# Patient Record
Sex: FEMALE | Employment: UNEMPLOYED | URBAN - METROPOLITAN AREA
[De-identification: names, ages, dates, MRNs, and addresses within clinical notes are randomized per-mention and may not be internally consistent; named-entity substitution may affect disease eponyms.]

---

## 2020-07-31 ENCOUNTER — TELEPHONE (OUTPATIENT)
Dept: INTERNAL MEDICINE CLINIC | Age: 50
End: 2020-07-31

## 2020-07-31 RX ORDER — LISINOPRIL AND HYDROCHLOROTHIAZIDE 12.5; 2 MG/1; MG/1
1 TABLET ORAL
Qty: 30 TAB | Refills: 0 | Status: SHIPPED | OUTPATIENT
Start: 2020-07-31 | End: 2020-08-21 | Stop reason: SDUPTHER

## 2020-07-31 RX ORDER — LORATADINE 10 MG/1
10 TABLET ORAL DAILY
Qty: 30 TAB | Refills: 0 | Status: SHIPPED | OUTPATIENT
Start: 2020-07-31 | End: 2020-08-04

## 2020-07-31 NOTE — TELEPHONE ENCOUNTER
I sent rx,but she has not been coming follow-up even though she is scheduled, I will give her only for 30 days and I already gave except lorazepam.,

## 2020-07-31 NOTE — TELEPHONE ENCOUNTER
Patient called requesting refills on Lorazepam 0.25 mg bid, Lisinopril-HCTZ 20-12.5 mg daily, Loratadine 10 mg daily.  30 day supply

## 2020-08-03 ENCOUNTER — TELEPHONE (OUTPATIENT)
Dept: INTERNAL MEDICINE CLINIC | Age: 50
End: 2020-08-03

## 2020-08-03 RX ORDER — LORATADINE 10 MG/1
10 TABLET ORAL DAILY
Qty: 30 TAB | Refills: 0 | Status: SHIPPED | OUTPATIENT
Start: 2020-08-03 | End: 2020-08-04 | Stop reason: SDUPTHER

## 2020-08-04 RX ORDER — LORATADINE 10 MG/1
TABLET ORAL
Qty: 30 TAB | Refills: 0 | Status: SHIPPED | OUTPATIENT
Start: 2020-08-04 | End: 2020-08-21 | Stop reason: SDUPTHER

## 2020-08-08 RX ORDER — CYCLOBENZAPRINE HCL 10 MG
TABLET ORAL
Qty: 15 TAB | Refills: 0 | OUTPATIENT
Start: 2020-08-08

## 2020-08-20 PROBLEM — I50.9 CHF (CONGESTIVE HEART FAILURE) (HCC): Status: ACTIVE | Noted: 2020-08-20

## 2020-08-20 PROBLEM — M72.2 PLANTAR FASCIITIS, BILATERAL: Status: ACTIVE | Noted: 2020-08-20

## 2020-08-20 PROBLEM — S76.319A STRAIN OF HAMSTRING: Status: ACTIVE | Noted: 2020-08-20

## 2020-08-20 PROBLEM — I10 ESSENTIAL HYPERTENSION: Status: ACTIVE | Noted: 2020-08-20

## 2020-08-20 PROBLEM — J30.2 SEASONAL RHINITIS: Status: ACTIVE | Noted: 2020-08-20

## 2020-08-20 PROBLEM — E55.9 VITAMIN D DEFICIENCY: Status: ACTIVE | Noted: 2020-08-20

## 2020-08-20 PROBLEM — Z82.49 FAMILY HISTORY OF CARDIOVASCULAR DISEASE: Status: ACTIVE | Noted: 2020-08-20

## 2020-08-20 PROBLEM — Z83.3 FAMILY HISTORY OF DIABETES MELLITUS (DM): Status: ACTIVE | Noted: 2020-08-20

## 2020-08-20 PROBLEM — E66.01 MORBID OBESITY (HCC): Status: ACTIVE | Noted: 2020-08-20

## 2020-08-20 RX ORDER — LANOLIN ALCOHOL/MO/W.PET/CERES
CREAM (GRAM) TOPICAL
COMMUNITY
End: 2020-08-21 | Stop reason: SDUPTHER

## 2020-08-20 RX ORDER — ERGOCALCIFEROL 1.25 MG/1
50000 CAPSULE ORAL
COMMUNITY
End: 2020-08-21 | Stop reason: SDUPTHER

## 2020-08-21 ENCOUNTER — OFFICE VISIT (OUTPATIENT)
Dept: INTERNAL MEDICINE CLINIC | Age: 50
End: 2020-08-21
Payer: COMMERCIAL

## 2020-08-21 VITALS
HEIGHT: 62 IN | DIASTOLIC BLOOD PRESSURE: 82 MMHG | RESPIRATION RATE: 18 BRPM | OXYGEN SATURATION: 98 % | HEART RATE: 72 BPM | BODY MASS INDEX: 40.34 KG/M2 | TEMPERATURE: 98.2 F | SYSTOLIC BLOOD PRESSURE: 130 MMHG | WEIGHT: 219.2 LBS

## 2020-08-21 DIAGNOSIS — J30.2 SEASONAL ALLERGIC RHINITIS, UNSPECIFIED TRIGGER: ICD-10-CM

## 2020-08-21 DIAGNOSIS — Z13.220 SCREENING FOR CHOLESTEROL LEVEL: ICD-10-CM

## 2020-08-21 DIAGNOSIS — D50.0 IRON DEFICIENCY ANEMIA DUE TO CHRONIC BLOOD LOSS: ICD-10-CM

## 2020-08-21 DIAGNOSIS — E55.9 VITAMIN D DEFICIENCY: ICD-10-CM

## 2020-08-21 DIAGNOSIS — I10 ESSENTIAL HYPERTENSION: Primary | ICD-10-CM

## 2020-08-21 DIAGNOSIS — F32.9 REACTIVE DEPRESSION (SITUATIONAL): ICD-10-CM

## 2020-08-21 DIAGNOSIS — F41.9 ANXIETY: ICD-10-CM

## 2020-08-21 DIAGNOSIS — E66.01 MORBID OBESITY (HCC): ICD-10-CM

## 2020-08-21 DIAGNOSIS — R73.03 PREDIABETES: ICD-10-CM

## 2020-08-21 DIAGNOSIS — M72.2 PLANTAR FASCIITIS, BILATERAL: ICD-10-CM

## 2020-08-21 DIAGNOSIS — N92.0 MENORRHAGIA WITH REGULAR CYCLE: ICD-10-CM

## 2020-08-21 PROCEDURE — 99214 OFFICE O/P EST MOD 30 MIN: CPT | Performed by: INTERNAL MEDICINE

## 2020-08-21 RX ORDER — LORATADINE 10 MG/1
10 TABLET ORAL DAILY
Qty: 90 TAB | Refills: 1 | Status: SHIPPED | OUTPATIENT
Start: 2020-08-21 | End: 2020-12-22 | Stop reason: SDUPTHER

## 2020-08-21 RX ORDER — LANOLIN ALCOHOL/MO/W.PET/CERES
325 CREAM (GRAM) TOPICAL 2 TIMES DAILY
Qty: 180 TAB | Refills: 1 | Status: SHIPPED | OUTPATIENT
Start: 2020-08-21 | End: 2022-05-19 | Stop reason: ALTCHOICE

## 2020-08-21 RX ORDER — HYDROXYZINE 25 MG/1
25 TABLET, FILM COATED ORAL
Qty: 90 TAB | Refills: 1 | Status: SHIPPED | OUTPATIENT
Start: 2020-08-21 | End: 2020-08-31

## 2020-08-21 RX ORDER — ERGOCALCIFEROL 1.25 MG/1
50000 CAPSULE ORAL
Qty: 4 CAP | Refills: 0 | Status: SHIPPED | OUTPATIENT
Start: 2020-08-21 | End: 2020-08-23 | Stop reason: SDUPTHER

## 2020-08-21 RX ORDER — FLUTICASONE PROPIONATE 50 MCG
2 SPRAY, SUSPENSION (ML) NASAL DAILY
Qty: 1 BOTTLE | Refills: 3 | Status: SHIPPED | OUTPATIENT
Start: 2020-08-21 | End: 2020-12-22 | Stop reason: SDUPTHER

## 2020-08-21 RX ORDER — LISINOPRIL AND HYDROCHLOROTHIAZIDE 12.5; 2 MG/1; MG/1
1 TABLET ORAL
Qty: 30 TAB | Refills: 0 | Status: SHIPPED | OUTPATIENT
Start: 2020-08-21 | End: 2020-08-21 | Stop reason: SDUPTHER

## 2020-08-21 RX ORDER — LISINOPRIL AND HYDROCHLOROTHIAZIDE 12.5; 2 MG/1; MG/1
1 TABLET ORAL
Qty: 90 TAB | Refills: 1 | Status: SHIPPED | OUTPATIENT
Start: 2020-08-21 | End: 2020-12-22 | Stop reason: SDUPTHER

## 2020-08-21 RX ORDER — OMEPRAZOLE 40 MG/1
40 CAPSULE, DELAYED RELEASE ORAL DAILY
Qty: 30 CAP | Refills: 2 | Status: SHIPPED | OUTPATIENT
Start: 2020-08-21 | End: 2020-12-22 | Stop reason: SDUPTHER

## 2020-08-21 RX ORDER — ESCITALOPRAM OXALATE 10 MG/1
10 TABLET ORAL
Qty: 90 TAB | Refills: 0 | Status: SHIPPED | OUTPATIENT
Start: 2020-08-21 | End: 2020-12-22 | Stop reason: SDUPTHER

## 2020-08-21 NOTE — PROGRESS NOTES
Tutu Barrera is a 51 y.o. female and presents with Follow-up (C/O  tingling and nubness feet bilat X several  months,Request meds for anxiety attacks ( nervous, jittery, ), )    She has established with me and came only once as a new patient. She told me that currently she is out of work and kept out of work by her orthopedic surgeon for her plantar fasciitis for 1 month. She is going to resume her job from 26th August.  She does not do any exercise but she was given steroid injection and Medrol Dosepak. She has allergies taking antihistaminic and steroid nasal spray and wants refill. 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 She has history of anemia due to menorrhagia and she had fibroid but she has not made appointment with gynecologist taking iron supplementation. She had echocardiogram of heart by cardiologist on 11th September and her EF was normal.  She has not consulted gynecologist over a year. She told me that sometimes she is anxious and jittery  And sometimes she has gas and bloating. No nausea vomiting no fever or chills no exposure to COVID-19 and no blood in stool or urine. She has some weight gain due to lack of physical activity. Her hemoglobin was her TSH was normal 10.9. And random blood sugar was 121,  . No depression.   No negative thoughts but sometimes she feels stressed out and she agreed to try low-dose of escitalopram.,    Sometimes she has tingling and numbness in both feet for several months, no neurologic weakness, she has plantar fasciitis she is not doing any stretching exercise and I gave her handouts. She has multiple vague complaints. Review of Systems    Review of Systems   Constitutional: Negative. HENT: Negative for hearing loss. Eyes: Negative for blurred vision. Respiratory: Negative for cough, shortness of breath and wheezing. Cardiovascular: Negative. Gastrointestinal: Negative for constipation, diarrhea, heartburn and nausea. sometimes  having gas and bloating. Genitourinary: Negative. Musculoskeletal: Negative for back pain and myalgias. Neurological: Positive for tingling. Negative for dizziness, focal weakness and weakness. Sometimes tingling and numbness in both feet for last several months also having plantar fasciitis in both feet. Psychiatric/Behavioral: Negative for depression and substance abuse. The patient is nervous/anxious. The patient does not have insomnia.          Past Medical History:   Diagnosis Date    Anemia     CHF (congestive heart failure) ([facility] [address] 8/20/2020    Depression     Essential hypertension 8/20/2020    Family history of cardiovascular disease 8/20/2020    Family history of diabetes mellitus (DM) 8/20/2020    GERD (gastroesophageal reflux disease)     Plantar fasciitis, bilateral 8/20/2020    Seasonal rhinitis 8/20/2020    Strain of hamstring 8/20/2020    Vitamin D deficiency 8/20/2020     Past Surgical History:   Procedure Laterality Date    ABDOMEN SURGERY PROC UNLISTED      HX GYN      HX TUBAL LIGATION  1999     Social History     Socioeconomic History    Marital status: SINGLE     Spouse name: Not on file    Number of children: Not on file    Years of education: Not on file    Highest education level: Not on file   Tobacco Use    Smoking status: Never Smoker    Smokeless tobacco: Never Used   Substance and Sexual Activity    Alcohol use: Not Currently    Drug use: Never     Family History   Problem Relation Age of Onset    Hypertension Mother     Diabetes Mother     Hypertension Father     Diabetes Father     Diabetes Sister     Hypertension Sister     Diabetes Brother     Hypertension Brother      Current Outpatient Medications   Medication Sig Dispense Refill    lisinopril-hydroCHLOROthiazide (PRINZIDE, ZESTORETIC) 20-12.5 mg per tablet Take 1 Tab by mouth every morning. 90 Tab 1    ferrous sulfate 325 mg (65 mg iron) tablet Take 1 Tab by mouth two (2) times a day. 180 Tab 1    loratadine (CLARITIN) 10 mg tablet Take 1 Tab by mouth daily. 90 Tab 1    ergocalciferol (Vitamin D2) 1,250 mcg (50,000 unit) capsule Take 1 Cap by mouth every seven (7) days for 30 days. 4 Cap 0    fluticasone propionate (FLONASE) 50 mcg/actuation nasal spray 2 Sprays by Both Nostrils route daily. 1 Bottle 3    hydrOXYzine HCL (ATARAX) 25 mg tablet Take 1 Tab by mouth three (3) times daily as needed for Anxiety for up to 10 days. 90 Tab 1    escitalopram oxalate (LEXAPRO) 10 mg tablet Take 1 Tab by mouth nightly. 90 Tab 0    omeprazole (PRILOSEC) 40 mg capsule Take 1 Cap by mouth daily. Take  1 capsule ,30 minutes before eating once a day 30 Cap 2     No Known Allergies    Objective:  Visit Vitals  /82 (BP 1 Location: Left arm, BP Patient Position: Sitting)   Pulse 72   Temp 98.2 °F (36.8 °C) (Oral)   Resp 18   Ht 5' 2\" (1.575 m)   Wt 219 lb 3.2 oz (99.4 kg)   SpO2 98%   BMI 40.09 kg/m²       Physical Exam:   Constitutional: General Appearance: morbid obese. Level of Distress: NAD. Ambulation: ambulating   Psychiatric: Mental Status: normal mood and affect and active and alert. Orientation: to time, place, and person. no agitation. ,normal eye contact. normal insight  Head: Head: normocephalic and atraumatic. Eyes: Pupils: PERRLA. Sclerae: non-icteric. Neck: Neck: supple, trachea midline, and no masses. Lymph Nodes: no cervical LAD.  Thyroid: no enlargement or nodules and non-tender. Lungs: Respiratory effort: no dyspnea. Auscultation: no wheezing, rales/crackles, or rhonchi and breath sounds normal and good air movement. Cardiovascular: Apical Impulse: not displaced. Heart Auscultation: normal S1 and S2; no murmurs, rubs, or gallops; and RRR. Neck vessels: no carotid bruits. Pulses including femoral / pedal: normal throughout. Abdomen: Bowel Sounds: normal. Inspection and Palpation: no tenderness, guarding, or masses and soft and non-distended. Liver: non-tender and no hepatomegaly. Spleen: non-tender and no splenomegaly. Musculoskeletal[de-identified] Extremities: no edema,no varicosities. No Calf tenderness. Neurologic: Gait and Station: normal gait and station. Motor Strength normal right and left. I could not appreciate any abnormal neurologic exam  Skin: Inspection and palpation: no rash, lesions, or ulcer. No results found for this or any previous visit. Assessment/Plan:    ICD-10-CM ICD-9-CM    1. Essential hypertension  I10 401.9 CBC WITH AUTOMATED DIFF      METABOLIC PANEL, BASIC   2. Iron deficiency anemia due to chronic blood loss  D50.0 280.0 CBC WITH AUTOMATED DIFF      IRON PROFILE      FERRITIN   3. Anxiety  F41.9 300.00    4. Reactive depression (situational)  F32.9 300.4    5. Prediabetes  R73.03 790.29 HEMOGLOBIN A1C WITH EAG   6. Vitamin D deficiency  E55.9 268.9 VITAMIN D, 25 HYDROXY   7. Seasonal allergic rhinitis, unspecified trigger  J30.2 477.9    8. Plantar fasciitis, bilateral  M72.2 728.71    9. Menorrhagia with regular cycle  N92.0 626.2    10. Morbid obesity ([facility] [address]  E66.01 278.01    11.  Screening for cholesterol level  Z13.220 V77.91 LIPID PANEL     Orders Placed This Encounter    CBC WITH AUTOMATED DIFF    IRON PROFILE    FERRITIN    VITAMIN D, 25 HYDROXY    HEMOGLOBIN A1C WITH EAG    METABOLIC PANEL, BASIC    LIPID PANEL    DISCONTD: lisinopril-hydroCHLOROthiazide (PRINZIDE, ZESTORETIC) 20-12.5 mg per tablet Sig: Take 1 Tab by mouth every morning. Dispense:  30 Tab     Refill:  0    lisinopril-hydroCHLOROthiazide (PRINZIDE, ZESTORETIC) 20-12.5 mg per tablet     Sig: Take 1 Tab by mouth every morning. Dispense:  90 Tab     Refill:  1    ferrous sulfate 325 mg (65 mg iron) tablet     Sig: Take 1 Tab by mouth two (2) times a day. Dispense:  180 Tab     Refill:  1    loratadine (CLARITIN) 10 mg tablet     Sig: Take 1 Tab by mouth daily. Dispense:  90 Tab     Refill:  1    ergocalciferol (Vitamin D2) 1,250 mcg (50,000 unit) capsule     Sig: Take 1 Cap by mouth every seven (7) days for 30 days. Dispense:  4 Cap     Refill:  0    fluticasone propionate (FLONASE) 50 mcg/actuation nasal spray     Si Sprays by Both Nostrils route daily. Dispense:  1 Bottle     Refill:  3    hydrOXYzine HCL (ATARAX) 25 mg tablet     Sig: Take 1 Tab by mouth three (3) times daily as needed for Anxiety for up to 10 days. Dispense:  90 Tab     Refill:  1    escitalopram oxalate (LEXAPRO) 10 mg tablet     Sig: Take 1 Tab by mouth nightly. Dispense:  90 Tab     Refill:  0    omeprazole (PRILOSEC) 40 mg capsule     Sig: Take 1 Cap by mouth daily. Take  1 capsule ,30 minutes before eating once a day     Dispense:  30 Cap     Refill:  2      hypertension controlled continue lisinopril hydrochlorothiazide 20/12.5 mg once a day. Diet low in sodium. She had undergone echocardiogram in 2019 showing almost normal ejection fraction. She has stopped seeing cardiologist.  She is not a good reliable historian and not compliant having regular follow-up with physicians. Anemia due to menorrhagia and uterine fibroid and she has not consulted gynecologist over a year and recommended to make appointment until that time continue iron supplementation 1 pill twice a day and most recent hemoglobin was 10.9 in 2020. Prediabetes with hemoglobin A1c 6.2% in February.   She never disclosed but today I reviewed her records from previous PCP and discussed with her she is not serious about her diet or weight discussed about diet less than 1600 ADA diet and exercise minimum walking minimum 150 minutes/week and hemoglobin A1c ordered. Complaining of tingling and numbness in both feet exact etiology is unclear but may be due to tarsal tunnel syndrome and she has plantar fasciitis I do not see any serious etiology in her legs she has taken steroid injection and oral steroid prescribed by orthopedic surgeon. Discussed about stretching exercise and handouts provided. Continued vitamin D once a week and vitamin D level ordered having very low vitamin D in February only 9.1 level. Feeling bloating I will start her on omeprazole and change in diet. Healthy diet with more vegetables and fruits and less fried food and fast food and less fat in the diet. 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 It seems like she has situational sadness but no negative thoughts and mainly due to the stress from the work side effects of escitalopram explained 10 mg once a day at bedtime. ,    Morbid obesity with lifestyle modification and BMI is 40 she should work on her diet and physical activity and she should be serious to cut down excessive calories in intake in the diet and balanced nutritional diet with portion control. Allergic rhinitis continued on take is on nasal spray and loratadine. I reviewed her labs done in April and I logged in Korin Vogel and discussed with her and I reviewed the consult note from cardiologist and echocardiogram from the cardiologist and previous more note from her previous PCP. Labs ordered. Refills given. Side effects discussed.   Education and counseling given.  Follow-up in 3 months. Side effects of hydroxyzine and escitalopram explained. If required I will refer for nerve conduction study. lose weight, increase physical activity, follow low fat diet, follow low salt diet, routine labs ordered, call if any problems    There are no Patient Instructions on file for this visit. Follow-up and Dispositions    · Return in about 3 months (around 11/21/2020) for htn ,prediabetes ,anemia,fasting labs now.

## 2020-08-22 LAB
25(OH)D3+25(OH)D2 SERPL-MCNC: 18.6 NG/ML (ref 30–100)
BASOPHILS # BLD AUTO: 0 X10E3/UL (ref 0–0.2)
BASOPHILS NFR BLD AUTO: 0 %
BUN SERPL-MCNC: 11 MG/DL (ref 6–24)
BUN/CREAT SERPL: 16 (ref 9–23)
CALCIUM SERPL-MCNC: 9.4 MG/DL (ref 8.7–10.2)
CHLORIDE SERPL-SCNC: 103 MMOL/L (ref 96–106)
CHOLEST SERPL-MCNC: 227 MG/DL (ref 100–199)
CO2 SERPL-SCNC: 24 MMOL/L (ref 20–29)
CREAT SERPL-MCNC: 0.7 MG/DL (ref 0.57–1)
EOSINOPHIL # BLD AUTO: 0.1 X10E3/UL (ref 0–0.4)
EOSINOPHIL NFR BLD AUTO: 1 %
ERYTHROCYTE [DISTWIDTH] IN BLOOD BY AUTOMATED COUNT: 14.4 % (ref 11.7–15.4)
EST. AVERAGE GLUCOSE BLD GHB EST-MCNC: 143 MG/DL
FERRITIN SERPL-MCNC: 13 NG/ML (ref 15–150)
GLUCOSE SERPL-MCNC: 119 MG/DL (ref 65–99)
HBA1C MFR BLD: 6.6 % (ref 4.8–5.6)
HCT VFR BLD AUTO: 33.7 % (ref 34–46.6)
HDLC SERPL-MCNC: 77 MG/DL
HGB BLD-MCNC: 10.8 G/DL (ref 11.1–15.9)
IMM GRANULOCYTES # BLD AUTO: 0 X10E3/UL (ref 0–0.1)
IMM GRANULOCYTES NFR BLD AUTO: 0 %
IRON SATN MFR SERPL: 7 % (ref 15–55)
IRON SERPL-MCNC: 29 UG/DL (ref 27–159)
LDLC SERPL CALC-MCNC: 133 MG/DL (ref 0–99)
LYMPHOCYTES # BLD AUTO: 1.9 X10E3/UL (ref 0.7–3.1)
LYMPHOCYTES NFR BLD AUTO: 32 %
MCH RBC QN AUTO: 23.9 PG (ref 26.6–33)
MCHC RBC AUTO-ENTMCNC: 32 G/DL (ref 31.5–35.7)
MCV RBC AUTO: 75 FL (ref 79–97)
MONOCYTES # BLD AUTO: 0.5 X10E3/UL (ref 0.1–0.9)
MONOCYTES NFR BLD AUTO: 9 %
NEUTROPHILS # BLD AUTO: 3.4 X10E3/UL (ref 1.4–7)
NEUTROPHILS NFR BLD AUTO: 58 %
PLATELET # BLD AUTO: 310 X10E3/UL (ref 150–450)
POTASSIUM SERPL-SCNC: 3.9 MMOL/L (ref 3.5–5.2)
RBC # BLD AUTO: 4.51 X10E6/UL (ref 3.77–5.28)
SODIUM SERPL-SCNC: 141 MMOL/L (ref 134–144)
TIBC SERPL-MCNC: 423 UG/DL (ref 250–450)
TRIGL SERPL-MCNC: 85 MG/DL (ref 0–149)
UIBC SERPL-MCNC: 394 UG/DL (ref 131–425)
VLDLC SERPL CALC-MCNC: 17 MG/DL (ref 5–40)
WBC # BLD AUTO: 5.9 X10E3/UL (ref 3.4–10.8)

## 2020-08-23 RX ORDER — ERGOCALCIFEROL 1.25 MG/1
50000 CAPSULE ORAL
Qty: 12 CAP | Refills: 0 | Status: SHIPPED | OUTPATIENT
Start: 2020-08-23 | End: 2020-11-21

## 2020-08-23 RX ORDER — ASCORBIC ACID 250 MG
250 TABLET ORAL 2 TIMES DAILY
Qty: 180 TAB | Refills: 1 | Status: SHIPPED | OUTPATIENT
Start: 2020-08-23 | End: 2021-06-11 | Stop reason: ALTCHOICE

## 2020-08-23 RX ORDER — METFORMIN HYDROCHLORIDE 500 MG/1
500 TABLET ORAL
Qty: 90 TAB | Refills: 0 | Status: SHIPPED | OUTPATIENT
Start: 2020-08-23 | End: 2020-12-22 | Stop reason: SINTOL

## 2020-08-23 RX ORDER — FERROUS SULFATE 325(65) MG
325 TABLET, DELAYED RELEASE (ENTERIC COATED) ORAL 2 TIMES DAILY WITH MEALS
Qty: 180 TAB | Refills: 1 | Status: SHIPPED | OUTPATIENT
Start: 2020-08-23 | End: 2020-12-22 | Stop reason: SDUPTHER

## 2020-08-23 NOTE — TELEPHONE ENCOUNTER
I prescribed  iron, vitamin C, metformin, vitamin D I sent all prescription to the pharmacy patient should collect patient  will be informed by my nurse for her abnormal labs and she should consult me in 3 months.,

## 2020-08-29 ENCOUNTER — TELEPHONE (OUTPATIENT)
Dept: INTERNAL MEDICINE CLINIC | Age: 50
End: 2020-08-29

## 2020-08-29 NOTE — TELEPHONE ENCOUNTER
S/W PT.   To review results of labs, per Dr. Mary Moulton are both low start OTC ferrous sulafate 325 mg per day, health diet, cut back on sugars, starches, fatty and fast foods, more leafy green veggies, start Meftormin 500 mg per  Day with large meal. VIT D  Low at 18.6 ( should at least be 30.0 ), RX at  the pharmacy 50,000 units once weekly

## 2020-12-22 ENCOUNTER — OFFICE VISIT (OUTPATIENT)
Dept: INTERNAL MEDICINE CLINIC | Age: 50
End: 2020-12-22
Payer: COMMERCIAL

## 2020-12-22 VITALS
DIASTOLIC BLOOD PRESSURE: 80 MMHG | OXYGEN SATURATION: 97 % | RESPIRATION RATE: 18 BRPM | WEIGHT: 214 LBS | HEIGHT: 62 IN | BODY MASS INDEX: 39.38 KG/M2 | SYSTOLIC BLOOD PRESSURE: 130 MMHG | HEART RATE: 72 BPM

## 2020-12-22 DIAGNOSIS — I10 ESSENTIAL HYPERTENSION: ICD-10-CM

## 2020-12-22 DIAGNOSIS — D64.9 ANEMIA, UNSPECIFIED TYPE: ICD-10-CM

## 2020-12-22 DIAGNOSIS — M54.2 NECK PAIN: ICD-10-CM

## 2020-12-22 DIAGNOSIS — E66.01 MORBID OBESITY (HCC): ICD-10-CM

## 2020-12-22 DIAGNOSIS — E11.8 CONTROLLED TYPE 2 DIABETES MELLITUS WITH COMPLICATION, WITHOUT LONG-TERM CURRENT USE OF INSULIN (HCC): ICD-10-CM

## 2020-12-22 DIAGNOSIS — K21.9 GASTROESOPHAGEAL REFLUX DISEASE WITHOUT ESOPHAGITIS: ICD-10-CM

## 2020-12-22 DIAGNOSIS — E78.00 PURE HYPERCHOLESTEROLEMIA: ICD-10-CM

## 2020-12-22 DIAGNOSIS — J30.9 ALLERGIC RHINITIS, UNSPECIFIED SEASONALITY, UNSPECIFIED TRIGGER: ICD-10-CM

## 2020-12-22 DIAGNOSIS — E55.9 VITAMIN D DEFICIENCY: ICD-10-CM

## 2020-12-22 PROCEDURE — 99214 OFFICE O/P EST MOD 30 MIN: CPT | Performed by: INTERNAL MEDICINE

## 2020-12-22 RX ORDER — OMEPRAZOLE 40 MG/1
40 CAPSULE, DELAYED RELEASE ORAL DAILY
Qty: 90 CAP | Refills: 1 | Status: SHIPPED | OUTPATIENT
Start: 2020-12-22 | End: 2021-06-11 | Stop reason: SDUPTHER

## 2020-12-22 RX ORDER — ESCITALOPRAM OXALATE 10 MG/1
10 TABLET ORAL
Qty: 90 TAB | Refills: 1 | Status: SHIPPED | OUTPATIENT
Start: 2020-12-22 | End: 2021-06-11 | Stop reason: DRUGHIGH

## 2020-12-22 RX ORDER — FLUTICASONE PROPIONATE 50 MCG
2 SPRAY, SUSPENSION (ML) NASAL DAILY
Qty: 1 BOTTLE | Refills: 3 | Status: SHIPPED | OUTPATIENT
Start: 2020-12-22 | End: 2022-06-20 | Stop reason: SDUPTHER

## 2020-12-22 RX ORDER — LORATADINE 10 MG/1
10 TABLET ORAL DAILY
Qty: 90 TAB | Refills: 1 | Status: SHIPPED | OUTPATIENT
Start: 2020-12-22 | End: 2022-06-20 | Stop reason: SDUPTHER

## 2020-12-22 RX ORDER — MELOXICAM 15 MG/1
15 TABLET ORAL DAILY
Qty: 30 TAB | Refills: 0 | Status: SHIPPED | OUTPATIENT
Start: 2020-12-22 | End: 2021-07-14 | Stop reason: ALTCHOICE

## 2020-12-22 RX ORDER — LISINOPRIL AND HYDROCHLOROTHIAZIDE 12.5; 2 MG/1; MG/1
1 TABLET ORAL
Qty: 90 TAB | Refills: 1 | Status: SHIPPED | OUTPATIENT
Start: 2020-12-22 | End: 2021-06-11 | Stop reason: SDUPTHER

## 2020-12-22 NOTE — PROGRESS NOTES
Krystin Davidson is a 53 y.o. female and presents with Follow Up Chronic Condition (htn ,prediabetes ,anemia)    She came for regular follow-up and she has multiple concerns. In last visit her hemoglobin A1c was 6.6% and she was started on Metformin,She tried a long time and she told me that she had, GI upset with dizziness and she has stopped taking Metformin, will repeat her labs before deciding medications. She ran out of nasal steroid spray and complaining of postnasal drainage which is clear. She has no exposure to COVID-19. No change in smell and taste. No fever or chills. According to her with previous PCP she had neck pain she had consulted at [facility] and  she was told that she has osteoarthritis and she has sometimes pain radiating to right upper arm. She agreed for physical therapy and to take as needed medicines before going to orthopedic surgeon. She told me she was prescribed nabumetone by previous PCP. Explained about side effects of  all NSAIDs on kidney stomach liver and blood pressure and other organs. She has anemia due to menorrhagia from fibroid. She is going to have surgery with gynecologist.  She takes iron supplementation. Sometimes she has heartburn takes omeprazole and needs refills. She told me she is up to date with her mammogram.  She does not want me to refer her for gastroenterologist for  having colonoscopy for now. She wants to wait. She needs refills for Lexapro for her anxiety. .  She has history of mild vitamin D deficiency. No negative thoughts. She told me she has been trying to change her diet but due to Thanksgiving she was  off from her diet. Pleasant  Review of Systems    Review of Systems   Constitutional: Negative. HENT: Negative for sore throat. Eyes: Negative for blurred vision. Respiratory: Negative for cough and wheezing. Cardiovascular: Negative. Gastrointestinal: Negative. Genitourinary: Negative. Musculoskeletal: Negative. Neurological: Negative for dizziness, tingling, tremors, sensory change and headaches. Endo/Heme/Allergies: Negative for polydipsia. Psychiatric/Behavioral: Negative for depression and memory loss. The patient does not have insomnia. Past Medical History:   Diagnosis Date    Anemia     CHF (congestive heart failure) ([facility] [address] 8/20/2020    Controlled type 2 diabetes mellitus with complication, without long-term current use of insulin ([facility] [address] 12/22/2020    Depression     Essential hypertension 8/20/2020    Family history of cardiovascular disease 8/20/2020    Family history of diabetes mellitus (DM) 8/20/2020    GERD (gastroesophageal reflux disease)     Plantar fasciitis, bilateral 8/20/2020    Pure hypercholesterolemia 12/22/2020    Seasonal rhinitis 8/20/2020    Strain of hamstring 8/20/2020    Vitamin D deficiency 8/20/2020     Past Surgical History:   Procedure Laterality Date    ABDOMEN SURGERY PROC UNLISTED      HX GYN      HX TUBAL LIGATION  1999     Social History     Socioeconomic History    Marital status: SINGLE     Spouse name: Not on file    Number of children: Not on file    Years of education: Not on file    Highest education level: Not on file   Tobacco Use    Smoking status: Never Smoker    Smokeless tobacco: Never Used   Substance and Sexual Activity    Alcohol use: Not Currently    Drug use: Never     Family History   Problem Relation Age of Onset    Hypertension Mother     Diabetes Mother     Hypertension Father     Diabetes Father     Diabetes Sister     Hypertension Sister     Diabetes Brother     Hypertension Brother      Current Outpatient Medications   Medication Sig Dispense Refill    fluticasone propionate (FLONASE) 50 mcg/actuation nasal spray 2 Sprays by Both Nostrils route daily. 1 Bottle 3    loratadine (CLARITIN) 10 mg tablet Take 1 Tab by mouth daily.  90 Tab 1    lisinopril-hydroCHLOROthiazide (PRINZIDE, ZESTORETIC) 20-12.5 mg per tablet Take 1 Tab by mouth every morning. 90 Tab 1    omeprazole (PRILOSEC) 40 mg capsule Take 1 Cap by mouth daily. Take  1 capsule ,30 minutes before eating once a day 90 Cap 1    escitalopram oxalate (LEXAPRO) 10 mg tablet Take 1 Tab by mouth nightly. 90 Tab 1    meloxicam (MOBIC) 15 mg tablet Take 1 Tab by mouth daily. Fistart once a day for 3 to 4 days then as needed sparingly 30 Tab 0    ascorbic acid, vitamin C, (VITAMIN C) 250 mg tablet Take 1 Tab by mouth two (2) times a day. 180 Tab 1    ferrous sulfate 325 mg (65 mg iron) tablet Take 1 Tab by mouth two (2) times a day. 180 Tab 1     No Known Allergies    Objective:  Visit Vitals  /80 (BP 1 Location: Right arm, BP Patient Position: Sitting)   Pulse 72   Resp 18   Ht 5' 2\" (1.575 m)   Wt 214 lb (97.1 kg)   SpO2 97%   BMI 39.14 kg/m²       Physical Exam:   Constitutional: General Appearance: Pleasant. Level of Distress: NAD. Psychiatric: Mental Status: normal mood and affect Orientation: to time, place, and person. ,normal eye contact. Head: Head: normocephalic and atraumatic. Eyes: Pupils: PERRLA. Sclerae: non-icteric. Neck: Neck: supple, trachea midline, and no masses. Lymph Nodes: no cervical LAD. Thyroid: no enlargement or nodules and non-tender. Lungs: Respiratory effort: no dyspnea. Auscultation: no wheezing, rales/crackles, or rhonchi and breath sounds normal and good air movement. Cardiovascular: Apical Impulse: not displaced. Heart Auscultation: normal S1 and S2; no murmurs, rubs, or gallops; and RRR. Neck vessels: no carotid bruits. Pulses including femoral / pedal: normal throughout. Abdomen: Bowel Sounds: normal. Inspection and Palpation: no tenderness, guarding, or masses and soft and non-distended. Liver: non-tender and no hepatomegaly. Spleen: non-tender and no splenomegaly. Musculoskeletal[de-identified] Extremities: no edema,no varicosities. No Calf tenderness.   Neurologic: Gait and Station: normal gait and station. Motor Strength normal right and left. Range of movement normal at neck. Skin: Inspection and palpation: no rash, lesions, or ulcer. Results for orders placed or performed in visit on 08/21/20   CBC WITH AUTOMATED DIFF   Result Value Ref Range    WBC 5.9 3.4 - 10.8 x10E3/uL    RBC 4.51 3.77 - 5.28 x10E6/uL    HGB 10.8 (L) 11.1 - 15.9 g/dL    HCT 33.7 (L) 34.0 - 46.6 %    MCV 75 (L) 79 - 97 fL    MCH 23.9 (L) 26.6 - 33.0 pg    MCHC 32.0 31.5 - 35.7 g/dL    RDW 14.4 11.7 - 15.4 %    PLATELET 910 947 - 284 x10E3/uL    NEUTROPHILS 58 Not Estab. %    Lymphocytes 32 Not Estab. %    MONOCYTES 9 Not Estab. %    EOSINOPHILS 1 Not Estab. %    BASOPHILS 0 Not Estab. %    ABS. NEUTROPHILS 3.4 1.4 - 7.0 x10E3/uL    Abs Lymphocytes 1.9 0.7 - 3.1 x10E3/uL    ABS. MONOCYTES 0.5 0.1 - 0.9 x10E3/uL    ABS. EOSINOPHILS 0.1 0.0 - 0.4 x10E3/uL    ABS. BASOPHILS 0.0 0.0 - 0.2 x10E3/uL    IMMATURE GRANULOCYTES 0 Not Estab. %    ABS. IMM.  GRANS. 0.0 0.0 - 0.1 x10E3/uL   IRON PROFILE   Result Value Ref Range    TIBC 423 250 - 450 ug/dL    UIBC 394 131 - 425 ug/dL    Iron 29 27 - 159 ug/dL    Iron % saturation 7 (LL) 15 - 55 %   FERRITIN   Result Value Ref Range    Ferritin 13 (L) 15 - 150 ng/mL   VITAMIN D, 25 HYDROXY   Result Value Ref Range    VITAMIN D, 25-HYDROXY 18.6 (L) 30.0 - 100.0 ng/mL   HEMOGLOBIN A1C WITH EAG   Result Value Ref Range    Hemoglobin A1c 6.6 (H) 4.8 - 5.6 %    Estimated average glucose 521 mg/dL   METABOLIC PANEL, BASIC   Result Value Ref Range    Glucose 119 (H) 65 - 99 mg/dL    BUN 11 6 - 24 mg/dL    Creatinine 0.70 0.57 - 1.00 mg/dL    GFR est non- >59 mL/min/1.73    GFR est  >59 mL/min/1.73    BUN/Creatinine ratio 16 9 - 23    Sodium 141 134 - 144 mmol/L    Potassium 3.9 3.5 - 5.2 mmol/L    Chloride 103 96 - 106 mmol/L    CO2 24 20 - 29 mmol/L    Calcium 9.4 8.7 - 10.2 mg/dL   LIPID PANEL   Result Value Ref Range    Cholesterol, total 227 (H) 100 - 199 mg/dL    Triglyceride 85 0 - 149 mg/dL    HDL Cholesterol 77 >39 mg/dL    VLDL, calculated 17 5 - 40 mg/dL    LDL, calculated 133 (H) 0 - 99 mg/dL       Assessment/Plan:      ICD-10-CM ICD-9-CM    1. Essential hypertension  I10 401.9 CBC WITH AUTOMATED DIFF      METABOLIC PANEL, COMPREHENSIVE      TSH 3RD GENERATION   2. Controlled type 2 diabetes mellitus with complication, without long-term current use of insulin (HCC)  E11.8 250.90 HEMOGLOBIN A1C WITH EAG   3. Anemia, unspecified type  D64.9 285.9    4. Neck pain  M54.2 723.1 REFERRAL TO PHYSICAL THERAPY   5. Allergic rhinitis, unspecified seasonality, unspecified trigger  J30.9 477.9    6. Gastroesophageal reflux disease without esophagitis  K21.9 530.81    7. Pure hypercholesterolemia  E78.00 272.0 LIPID PANEL   8. Vitamin D deficiency  E55.9 268.9    9. Morbid obesity ([facility] [address]  E66.01 278.01      Orders Placed This Encounter    CBC WITH AUTOMATED DIFF    METABOLIC PANEL, COMPREHENSIVE    LIPID PANEL    HEMOGLOBIN A1C WITH EAG    TSH 3RD GENERATION    REFERRAL TO PHYSICAL THERAPY     Referral Priority:   Routine     Referral Type:   PT/OT/ST     Referral Reason:   Specialty Services Required     Requested Specialty:   Physical Therapy     Number of Visits Requested:   1    fluticasone propionate (FLONASE) 50 mcg/actuation nasal spray     Si Sprays by Both Nostrils route daily. Dispense:  1 Bottle     Refill:  3    loratadine (CLARITIN) 10 mg tablet     Sig: Take 1 Tab by mouth daily. Dispense:  90 Tab     Refill:  1    lisinopril-hydroCHLOROthiazide (PRINZIDE, ZESTORETIC) 20-12.5 mg per tablet     Sig: Take 1 Tab by mouth every morning. Dispense:  90 Tab     Refill:  1    omeprazole (PRILOSEC) 40 mg capsule     Sig: Take 1 Cap by mouth daily. Take  1 capsule ,30 minutes before eating once a day     Dispense:  90 Cap     Refill:  1    escitalopram oxalate (LEXAPRO) 10 mg tablet     Sig: Take 1 Tab by mouth nightly.      Dispense:  90 Tab     Refill:  1    meloxicam (MOBIC) 15 mg tablet     Sig: Take 1 Tab by mouth daily. Fistart once a day for 3 to 4 days then as needed sparingly     Dispense:  30 Tab     Refill:  0     Hypertension controlled continue lisinopril hydrochlorothiazide 20/12.5 mg once a day. Diet low in sodium. Type 2 diabetes mellitus with hemoglobin A1c 6.6%. She could not tolerate side effects of Metformin and currently off from metformin. I will repeat her hemoglobin A1c before  making any changes. Strongly recommended to take diet restriction from starch sugar and natural and artificial sugar. Diabetic education given and diabetic dietary education given. Walking minimum 30 minutes 5 days a week. Anemia due to menorrhagia and according to her she has fibroid and she follows gynecologist.  Continue iron supplementation and follow the recommendation given by gynecologist.    Neck pain radiating to right upper arm. She used to take nabumetone by her previous PCP and she had consulted [facility] in the past.  Explained the side effects of all NSAIDs and started on meloxicam to be taken sparingly and referred her for physical therapy twice a week for 6 weeks according to her she has undergone x-ray in the past and she was told she has osteoarthritis. If she has no improvement I will refer her to orthopedic surgeon. Hypercholesterolemia diet low in fat. Labs ordered. Vitamin D deficiency continue OTC vitamin D supplementation. GERD continue omeprazole. Anxiety no negative thoughts and no history of panic attack continue generic brand of Lexapro 10 mg at bedtime. Allergic rhinitis causing sinusitis with clear drainage no exposure to COVID-19. Continue social distancing and wearing mask and started on and continued on cetirizine and fluticasone nasal spray. Morbid obesity discussed about 1200-calorie diet plan and exercise. Labs ordered. Follow-up in 3 months. Refills given.     continue present plan, routine labs ordered, call if any problems    There are no Patient Instructions on file for this visit. Follow-up and Dispositions    · Return in about 8 weeks (around 2/16/2021) for htn ,neck pain ,and ref to rt upper arm and fasting labs now and ref to phy therapy.

## 2021-06-09 LAB — EF %, EXTERNAL: NORMAL

## 2021-06-11 ENCOUNTER — OFFICE VISIT (OUTPATIENT)
Dept: INTERNAL MEDICINE CLINIC | Age: 51
End: 2021-06-11
Payer: COMMERCIAL

## 2021-06-11 ENCOUNTER — TELEPHONE (OUTPATIENT)
Dept: INTERNAL MEDICINE CLINIC | Age: 51
End: 2021-06-11

## 2021-06-11 VITALS
HEART RATE: 72 BPM | HEIGHT: 62 IN | DIASTOLIC BLOOD PRESSURE: 82 MMHG | OXYGEN SATURATION: 97 % | RESPIRATION RATE: 12 BRPM | WEIGHT: 225.4 LBS | BODY MASS INDEX: 41.48 KG/M2 | SYSTOLIC BLOOD PRESSURE: 130 MMHG

## 2021-06-11 DIAGNOSIS — M25.562 LEFT KNEE PAIN, UNSPECIFIED CHRONICITY: ICD-10-CM

## 2021-06-11 DIAGNOSIS — Z09 HOSPITAL DISCHARGE FOLLOW-UP: ICD-10-CM

## 2021-06-11 DIAGNOSIS — M79.605 PAIN OF LEFT LOWER EXTREMITY: ICD-10-CM

## 2021-06-11 PROBLEM — R00.2 PALPITATION: Status: ACTIVE | Noted: 2021-06-11

## 2021-06-11 PROCEDURE — 99214 OFFICE O/P EST MOD 30 MIN: CPT | Performed by: INTERNAL MEDICINE

## 2021-06-11 PROCEDURE — 1111F DSCHRG MED/CURRENT MED MERGE: CPT | Performed by: INTERNAL MEDICINE

## 2021-06-11 RX ORDER — LISINOPRIL AND HYDROCHLOROTHIAZIDE 12.5; 2 MG/1; MG/1
1 TABLET ORAL EVERY MORNING
Qty: 90 TABLET | Refills: 1 | Status: SHIPPED | OUTPATIENT
Start: 2021-06-11 | End: 2021-12-20 | Stop reason: SDUPTHER

## 2021-06-11 RX ORDER — HYDROXYZINE 25 MG/1
25 TABLET, FILM COATED ORAL
Qty: 90 TABLET | Refills: 1 | Status: SHIPPED | OUTPATIENT
Start: 2021-06-11 | End: 2021-06-21

## 2021-06-11 RX ORDER — ESCITALOPRAM OXALATE 20 MG/1
20 TABLET ORAL DAILY
Qty: 90 TABLET | Refills: 0 | Status: SHIPPED | OUTPATIENT
Start: 2021-06-11 | End: 2022-05-19 | Stop reason: SDUPTHER

## 2021-06-11 RX ORDER — OMEPRAZOLE 40 MG/1
40 CAPSULE, DELAYED RELEASE ORAL DAILY
Qty: 90 CAPSULE | Refills: 0 | Status: SHIPPED | OUTPATIENT
Start: 2021-06-11

## 2021-06-11 NOTE — PROGRESS NOTES
Yasir Alvarez is a 53 y.o. female and presents with Hospital Follow Up [facility] 06/07/21-06/09/2021 SOB, Palpitations, Swelling in left leg, Possible bloodclot )    Ms. Edilson Rowley came for follow-up after recent hospital discharge. She visited emergency room at [address] and according to her history she was referred to [facility] where she stayed from seventh Joellen until ninth June having palpitation shortness of breath and swelling in leg and she told me that she had possible blood clot. We will try to get the notes. She is a poor historian. I reviewed that her venous Doppler is negative for blood clot and also CTPA is negative for pulmonary blood clot and her echo was showing normal EF. She has made appointment with [facility] cardiology. Today she came without any discharge papers. She has no palpitation. She has history of anxiety and depression. She works at [email or web address] for [employer] house/. She needs refill for her anxiety pills. Her blood pressure is controlled. She does not know even the results that were done in the hospital.  She underwent x-ray of the left knee joint showing mild osteoarthritis    No negative thoughts. No chest pain or shortness of breath now. She has probably some GYN procedure in third week of June she does not know the  diagnosis for her GYN problem it seems like she might have fibroids causing menorrhagia causing anemia.,  She had consulted me last time in December 2020. In the past she had type 2 diabetes mellitus with hemoglobin A1c 6.6% but as per my previous notes she could not take Metformin due to GI side effects. Review of Systems    Review of Systems   Constitutional: Negative. Recently discharged from [facility] and visited [facility] having some shortness of breath and palpitation and leg pain. She was having left knee mild osteoarthritis negative for blood clot   HENT: Negative for congestion.     Respiratory: Negative for cough, shortness of breath, wheezing and stridor. Cardiovascular: Negative. Genitourinary: Negative. Musculoskeletal: Positive for joint pain. Negative for back pain, falls and neck pain. History of left knee pain due to mild osteoarthritis. Neurological: Negative for dizziness, tremors, sensory change and focal weakness. Endo/Heme/Allergies: Negative for polydipsia. Psychiatric/Behavioral: Negative for memory loss and suicidal ideas. The patient does not have insomnia.          History of anxiety and depression        Past Medical History:   Diagnosis Date    Anemia     CHF (congestive heart failure) ([facility] 8/20/2020    Controlled type 2 diabetes mellitus with complication, without long-term current use of insulin ([facility] 12/22/2020    Depression     Essential hypertension 8/20/2020    Family history of cardiovascular disease 8/20/2020    Family history of diabetes mellitus (DM) 8/20/2020    GERD (gastroesophageal reflux disease)     Plantar fasciitis, bilateral 8/20/2020    Pure hypercholesterolemia 12/22/2020    Seasonal rhinitis 8/20/2020    Strain of hamstring 8/20/2020    Vitamin D deficiency 8/20/2020     Past Surgical History:   Procedure Laterality Date    HX GYN      HX TUBAL LIGATION  1999    VA ABDOMEN SURGERY PROC UNLISTED       Social History     Socioeconomic History    Marital status: SINGLE     Spouse name: Not on file    Number of children: Not on file    Years of education: Not on file    Highest education level: Not on file   Tobacco Use    Smoking status: Never Smoker    Smokeless tobacco: Never Used   Substance and Sexual Activity    Alcohol use: Not Currently    Drug use: Never     Social Determinants of Health     Financial Resource Strain:     Difficulty of Paying Living Expenses:    Food Insecurity:     Worried About Running Out of Food in the Last Year:     [address] in the Last Year:    Transportation Needs:     Lack of Transportation (Medical):  Lack of Transportation (Non-Medical):    Physical Activity:     Days of Exercise per Week:     Minutes of Exercise per Session:    Stress:     Feeling of Stress :    Social Connections:     Frequency of Communication with Friends and Family:     Frequency of Social Gatherings with Friends and Family:     Attends [employer] Services:     Active Member of Clubs or Organizations:     Attends Club or Organization Meetings:     Marital Status:      Family History   Problem Relation Age of Onset    Hypertension Mother     Diabetes Mother     Hypertension Father     Diabetes Father     Diabetes Sister     Hypertension Sister     Diabetes Brother     Hypertension Brother      Current Outpatient Medications   Medication Sig Dispense Refill    escitalopram oxalate (LEXAPRO) 20 mg tablet Take 1 Tablet by mouth daily. 90 Tablet 0    lisinopril-hydroCHLOROthiazide (PRINZIDE, ZESTORETIC) 20-12.5 mg per tablet Take 1 Tablet by mouth Every morning. 90 Tablet 1    omeprazole (PRILOSEC) 40 mg capsule Take 1 Capsule by mouth daily. Take  1 capsule ,30 minutes before eating once a day 90 Capsule 0    hydrOXYzine HCL (ATARAX) 25 mg tablet Take 1 Tablet by mouth three (3) times daily as needed for Anxiety for up to 10 days. 90 Tablet 1    fluticasone propionate (FLONASE) 50 mcg/actuation nasal spray 2 Sprays by Both Nostrils route daily. 1 Bottle 3    loratadine (CLARITIN) 10 mg tablet Take 1 Tab by mouth daily. 90 Tab 1    meloxicam (MOBIC) 15 mg tablet Take 1 Tab by mouth daily. Fistart once a day for 3 to 4 days then as needed sparingly 30 Tab 0    ferrous sulfate 325 mg (65 mg iron) tablet Take 1 Tab by mouth two (2) times a day.  180 Tab 1     No Known Allergies    Objective:  Visit Vitals  /82 (BP 1 Location: Right upper arm, BP Patient Position: Sitting, BP Cuff Size: Adult)   Pulse 72   Resp 12   Ht 5' 2\" (1.575 m)   Wt 225 lb 6.4 oz (102.2 kg)   SpO2 97%   BMI 41.23 kg/m²       Physical Exam:   Constitutional: General Appearance: Pleasant. Level of Distress: NAD. Psychiatric: Mental Status: normal mood and affect Orientation: to time, place, and person. ,normal eye contact./  Poor insight  Head: Head: normocephalic and atraumatic. Eyes: Pupils: PERRLA. Sclerae: non-icteric. Neck: Neck: supple, trachea midline, and no masses. Lymph Nodes: no cervical LAD. Thyroid: no enlargement or nodules and non-tender. Lungs: Respiratory effort: no dyspnea. Auscultation: no wheezing, rales/crackles, or rhonchi and breath sounds normal and good air movement. Cardiovascular: Apical Impulse: not displaced. Heart Auscultation: normal S1 and S2; no murmurs, rubs, or gallops; and RRR. Neck vessels: no carotid bruits. Pulses including femoral / pedal: normal throughout. Abdomen: Bowel Sounds: normal. Inspection and Palpation: no tenderness, guarding, or masses and soft and non-distended. Liver: non-tender and no hepatomegaly. Spleen: non-tender and no splenomegaly. Musculoskeletal[de-identified] Extremities: no edema,no varicosities. No Calf tenderness. Neurologic: Gait and Station: normal gait and station. Motor Strength normal right and left. Skin: Inspection and palpation: no rash, lesions, or ulcer. Results for orders placed or performed in visit on 08/21/20   CBC WITH AUTOMATED DIFF   Result Value Ref Range    WBC 5.9 3.4 - 10.8 x10E3/uL    RBC 4.51 3.77 - 5.28 x10E6/uL    HGB 10.8 (L) 11.1 - 15.9 g/dL    HCT 33.7 (L) 34.0 - 46.6 %    MCV 75 (L) 79 - 97 fL    MCH 23.9 (L) 26.6 - 33.0 pg    MCHC 32.0 31.5 - 35.7 g/dL    RDW 14.4 11.7 - 15.4 %    PLATELET 380 633 - 686 x10E3/uL    NEUTROPHILS 58 Not Estab. %    Lymphocytes 32 Not Estab. %    MONOCYTES 9 Not Estab. %    EOSINOPHILS 1 Not Estab. %    BASOPHILS 0 Not Estab. %    ABS. NEUTROPHILS 3.4 1.4 - 7.0 x10E3/uL    Abs Lymphocytes 1.9 0.7 - 3.1 x10E3/uL    ABS. MONOCYTES 0.5 0.1 - 0.9 x10E3/uL    ABS.  EOSINOPHILS 0.1 0.0 - 0.4 x10E3/uL ABS. BASOPHILS 0.0 0.0 - 0.2 x10E3/uL    IMMATURE GRANULOCYTES 0 Not Estab. %    ABS. IMM. GRANS. 0.0 0.0 - 0.1 x10E3/uL   IRON PROFILE   Result Value Ref Range    TIBC 423 250 - 450 ug/dL    UIBC 394 131 - 425 ug/dL    Iron 29 27 - 159 ug/dL    Iron % saturation 7 (LL) 15 - 55 %   FERRITIN   Result Value Ref Range    Ferritin 13 (L) 15 - 150 ng/mL   VITAMIN D, 25 HYDROXY   Result Value Ref Range    VITAMIN D, 25-HYDROXY 18.6 (L) 30.0 - 100.0 ng/mL   HEMOGLOBIN A1C WITH EAG   Result Value Ref Range    Hemoglobin A1c 6.6 (H) 4.8 - 5.6 %    Estimated average glucose 602 mg/dL   METABOLIC PANEL, BASIC   Result Value Ref Range    Glucose 119 (H) 65 - 99 mg/dL    BUN 11 6 - 24 mg/dL    Creatinine 0.70 0.57 - 1.00 mg/dL    GFR est non- >59 mL/min/1.73    GFR est  >59 mL/min/1.73    BUN/Creatinine ratio 16 9 - 23    Sodium 141 134 - 144 mmol/L    Potassium 3.9 3.5 - 5.2 mmol/L    Chloride 103 96 - 106 mmol/L    CO2 24 20 - 29 mmol/L    Calcium 9.4 8.7 - 10.2 mg/dL   LIPID PANEL   Result Value Ref Range    Cholesterol, total 227 (H) 100 - 199 mg/dL    Triglyceride 85 0 - 149 mg/dL    HDL Cholesterol 77 >39 mg/dL    VLDL, calculated 17 5 - 40 mg/dL    LDL, calculated 133 (H) 0 - 99 mg/dL       Assessment/Plan:      ICD-10-CM ICD-9-CM    1. Hospital discharge follow-up  Z09 V67.59 ME DISCHARGE MEDS RECONCILED W/ CURRENT OUTPATIENT MED LIST   2. Left knee pain, unspecified chronicity  M25.562 719.46    3. Pain of left lower extremity  M79.605 729.5      Orders Placed This Encounter    ME DISCHARGE MEDS RECONCILED W/ CURRENT OUTPATIENT MED LIST    escitalopram oxalate (LEXAPRO) 20 mg tablet     Sig: Take 1 Tablet by mouth daily. Dispense:  90 Tablet     Refill:  0    lisinopril-hydroCHLOROthiazide (PRINZIDE, ZESTORETIC) 20-12.5 mg per tablet     Sig: Take 1 Tablet by mouth Every morning. Dispense:  90 Tablet     Refill:  1    omeprazole (PRILOSEC) 40 mg capsule     Sig: Take 1 Capsule by mouth daily. Take  1 capsule ,30 minutes before eating once a day     Dispense:  90 Capsule     Refill:  0    hydrOXYzine HCL (ATARAX) 25 mg tablet     Sig: Take 1 Tablet by mouth three (3) times daily as needed for Anxiety for up to 10 days. Dispense:  90 Tablet     Refill:  1       Recent hospital discharge from [facility] on ninth June visited [facility] ER for leg pain, was referred to [facility] where she stayed for 3 days and she had detailed work-up. Patient told me that she had injection in her abdominal wall for blood clot , she is poor historian she does not know the diagnosis , try to get the records from the computer showing,    X-ray of the left knee showing mild left knee osteoarthritis    Underwent myocardial perfusion study on 11th June showing no evidence of infarction or stress-induced ischemia with EF 74%. Underwent CTA chest for PE showing no evidence of pulmonary embolism or acute aortic abnormalities chest x-ray was clear lung and normal.    Underwent venous duplex for complaining of chest pain shortness of breath leg swelling and it was showing no blood clot. Underwent echo and cardiac enzymes negative. LDL 96. Hemoglobin A1c 6%. Cardiology consultation was done. 2D echo shows 65 to 70% EF and wall motion was normal no regional wall motion abnormalities and stress test negative continued on aspirin 81 mg/day. .  She is recommended to have follow-up with Dr. Claudio Brice cardiologist at [facility] cardiology. Her blood pressure is controlled today. She has history of anxiety and depression so continued on escitalopram and hydroxyzine. She has a pain due to left knee osteoarthritis Ortho consult was done.     Today her blood pressure is well controlled , it seems like she has lack of insight she wanted  letter to stay out of work that she stayed out of work from 6 June until 9 June and I kept her out of work, until 14th June and she can resume back on 15th June.,    Her blood pressure is controlled so continued on lisinopril hydrochlorothiazide 20/12.5 mg once a day. Diet low in sodium. History of anemia due to probably menorrhagia from fibroid I am not sure she does not know the GYN problem that she has,She told me she has some polyp in her uterus and I am thinking it is fibroid continued on iron supplementation 1 pill twice a day. Anxiety with depression continued on escitalopram increased to 20 mg/day and continued on hydroxyzine. Left knee pain continued on meloxicam.    GERD continued on omeprazole. Allergic rhinitis continued on fluticasone nasal spray and loratadine. Follow-up in 4 weeks. Education and counseling given. I reviewed all the work-up done at the hospital.  I reviewed her labs. On ninth June her BUN/creatinine was 10/0.82 and her hemoglobin was 10.7 with hematocrit 33.3 rest of the labs were normal lipid profile was showing triglyceride 190 and LDL 96 with HDL 62 and total cholesterol 196 troponin was negative. lose weight, increase physical activity, continue present plan, call if any problems    There are no Patient Instructions on file for this visit. Follow-up and Dispositions    · Return in about 4 weeks (around 7/9/2021) for chronic , follow up for chronic condition.

## 2021-06-11 NOTE — PROGRESS NOTES
Chief Complaint   Patient presents with   [facility] Follow Up     [facility] 06/07/21-06/09/2021 SOB, Palpitations, Swelling in left leg, Possible bloodclot      1. Have you been to the ER, urgent care clinic since your last visit? Hospitalized since your last visit? [facility] 6/7-6/9     2. Have you seen or consulted any other health care providers outside of the [address] [place] [address] since your last visit? Include any pap smears or colon screening.  Orthopedics  06/11/2021     Visit Vitals  /85 (BP 1 Location: Left upper arm, BP Patient Position: Sitting, BP Cuff Size: Adult)   Pulse 89   Resp 12   Ht 5' 2\" (1.575 m)   Wt 225 lb 6.4 oz (102.2 kg)   SpO2 97%   BMI 41.23 kg/m²

## 2021-06-11 NOTE — TELEPHONE ENCOUNTER
Patient came by the office stating the name of the medication she needs is:    Hydroxyzine HCL 25 MG - takes 3 times a day

## 2021-07-11 PROBLEM — I50.9 CHF (CONGESTIVE HEART FAILURE) (HCC): Status: RESOLVED | Noted: 2020-08-20 | Resolved: 2021-07-11

## 2021-07-11 PROBLEM — E11.8 CONTROLLED TYPE 2 DIABETES MELLITUS WITH COMPLICATION, WITHOUT LONG-TERM CURRENT USE OF INSULIN (HCC): Status: RESOLVED | Noted: 2020-12-22 | Resolved: 2021-07-11

## 2021-07-14 ENCOUNTER — OFFICE VISIT (OUTPATIENT)
Dept: INTERNAL MEDICINE CLINIC | Age: 51
End: 2021-07-14
Payer: COMMERCIAL

## 2021-07-14 VITALS
BODY MASS INDEX: 41.85 KG/M2 | SYSTOLIC BLOOD PRESSURE: 130 MMHG | OXYGEN SATURATION: 99 % | HEART RATE: 72 BPM | HEIGHT: 62 IN | DIASTOLIC BLOOD PRESSURE: 84 MMHG | RESPIRATION RATE: 18 BRPM | TEMPERATURE: 98.1 F | WEIGHT: 227.4 LBS

## 2021-07-14 DIAGNOSIS — J30.9 ALLERGIC RHINITIS, UNSPECIFIED SEASONALITY, UNSPECIFIED TRIGGER: ICD-10-CM

## 2021-07-14 DIAGNOSIS — E11.65 TYPE 2 DIABETES MELLITUS WITH HYPERGLYCEMIA, WITHOUT LONG-TERM CURRENT USE OF INSULIN (HCC): ICD-10-CM

## 2021-07-14 DIAGNOSIS — F32.A ANXIETY AND DEPRESSION: ICD-10-CM

## 2021-07-14 DIAGNOSIS — K21.9 GASTROESOPHAGEAL REFLUX DISEASE WITHOUT ESOPHAGITIS: ICD-10-CM

## 2021-07-14 DIAGNOSIS — I10 ESSENTIAL HYPERTENSION: ICD-10-CM

## 2021-07-14 DIAGNOSIS — N39.43 DRIBBLING: ICD-10-CM

## 2021-07-14 DIAGNOSIS — E78.00 PURE HYPERCHOLESTEROLEMIA: ICD-10-CM

## 2021-07-14 DIAGNOSIS — R39.14 FEELING OF INCOMPLETE BLADDER EMPTYING: ICD-10-CM

## 2021-07-14 DIAGNOSIS — F41.9 ANXIETY AND DEPRESSION: ICD-10-CM

## 2021-07-14 DIAGNOSIS — D64.9 ANEMIA, UNSPECIFIED TYPE: ICD-10-CM

## 2021-07-14 DIAGNOSIS — J01.00 ACUTE NON-RECURRENT MAXILLARY SINUSITIS: ICD-10-CM

## 2021-07-14 DIAGNOSIS — E66.01 MORBID OBESITY (HCC): ICD-10-CM

## 2021-07-14 DIAGNOSIS — Z83.3 FAMILY HISTORY OF DIABETES MELLITUS (DM): ICD-10-CM

## 2021-07-14 DIAGNOSIS — M25.562 LEFT KNEE PAIN, UNSPECIFIED CHRONICITY: ICD-10-CM

## 2021-07-14 DIAGNOSIS — Z82.49 FAMILY HISTORY OF CARDIOVASCULAR DISEASE: ICD-10-CM

## 2021-07-14 PROCEDURE — 99214 OFFICE O/P EST MOD 30 MIN: CPT | Performed by: INTERNAL MEDICINE

## 2021-07-14 RX ORDER — AMOXICILLIN AND CLAVULANATE POTASSIUM 875; 125 MG/1; MG/1
1 TABLET, FILM COATED ORAL 2 TIMES DAILY
Qty: 14 TABLET | Refills: 0 | Status: SHIPPED | OUTPATIENT
Start: 2021-07-14 | End: 2021-11-12 | Stop reason: ALTCHOICE

## 2021-07-14 RX ORDER — DICLOFENAC SODIUM 75 MG/1
75 TABLET, DELAYED RELEASE ORAL 2 TIMES DAILY
Qty: 60 TABLET | Refills: 0 | Status: SHIPPED | OUTPATIENT
Start: 2021-07-14 | End: 2021-11-12

## 2021-07-14 NOTE — LETTER
7/14/2021 11:32 AM    Ms. Julissa Saxena  [address] [facility] [address]        Current Outpatient Medications   Medication Instructions    escitalopram oxalate (LEXAPRO) 20 mg, Oral, DAILY    ferrous sulfate 325 mg, Oral, 2 TIMES DAILY    fluticasone propionate (FLONASE) 50 mcg/actuation nasal spray 2 Sprays, Both Nostrils, DAILY    lisinopril-hydroCHLOROthiazide (PRINZIDE, ZESTORETIC) 20-12.5 mg per tablet 1 Tablet, Oral, EVERY MORNING    loratadine (CLARITIN) 10 mg, Oral, DAILY    meloxicam (MOBIC) 15 mg, Oral, DAILY, Fistart once a day for 3 to 4 days then as needed sparingly    omeprazole (PRILOSEC) 40 mg, Oral, DAILY, Take  1 capsule ,30 minutes before eating once a day

## 2021-07-14 NOTE — PROGRESS NOTES
1. Have you been to the ER, urgent care clinic since your last visit? Hospitalized since your last visit? No    2. Have you seen or consulted any other health care providers outside of the [address] [place] [address] since your last visit? Include any pap smears or colon screening.  No     Chief Complaint   Patient presents with    Follow Up Chronic Condition    Hypertension    Diabetes    Sinus Infection    Urinary Frequency    Leg Swelling     Visit Vitals  /86 (BP 1 Location: Left arm, BP Patient Position: Sitting, BP Cuff Size: Adult)   Pulse 78   Temp 98.1 °F (36.7 °C) (Oral)   Resp 18   Ht 5' 2\" (1.575 m)   Wt 227 lb 6.4 oz (103.1 kg)   SpO2 99%   BMI 41.59 kg/m²

## 2021-07-14 NOTE — PROGRESS NOTES
Estelle Stevens is a 53 y.o. female and presents with Follow Up Chronic Condition, Hypertension, Diabetes, Sinus Infection, Urinary Frequency, and Leg Swelling    Rosy Lowery came for regular follow-up for her chronic medical problems , in last visit she had  follow-up after hospital discharge from [facility] having left leg pain and she was ruled out for blood clot and she had left knee osteoarthritis and I had referred her to orthopedic surgeon she told me she follows at [facility] orthopedic no details available she is a very vague poor historian, not able to tell reliable history. She told me that she has scheduled surgery with Dr martinez 07/28/2021, having uterine polyp we will try to get notes from [address] of woman,. She told me that she is not emptying her bladder completely. After passing urine she has some residual urine left out and she has dribbling. I referred her to Dr. Femi Santiago. Her blood pressure is controlled with current medication. She has history of anxiety depression taking escitalopram.  She takes omeprazole. She has history of anemia. Taking iron supplementation. She had her last regular labs done in August 2020 and recent labs done at [facility]. In August she had her hemoglobin A1c is 6.6%. Her vitamin D level is 18.6 and lipid profile was slightly uncontrolled. For her her hemoglobin was around 10.8 so I ordered labs again to touch base her hemoglobin A1c and cholesterol. She works in nursing home? She claims that she has sinus congestion and she has not been feeling better taking fluticasone nasal spray and loratadine. She feels pressure in her maxillary sinus no fever or chills. We have no information regarding her Covid vaccine updates                  Review of Systems    Review of Systems   Constitutional: Negative. Vague poor unreliable, historian. HENT: Negative for congestion, sinus pain and sore throat. Eyes: Negative for blurred vision. Respiratory: Negative for cough, shortness of breath and wheezing. Cardiovascular: Negative. Gastrointestinal: Negative. Genitourinary:        Incomplete  Emptying of bladder   Musculoskeletal: Negative for falls, joint pain and myalgias. Lt knee pain    Neurological: Negative for dizziness, tingling, sensory change and headaches. Psychiatric/Behavioral: Negative for depression, hallucinations and memory loss. The patient does not have insomnia. Past Medical History:   Diagnosis Date    Anemia     CHF (congestive heart failure) ([facility] [address] 8/20/2020    Controlled type 2 diabetes mellitus with complication, without long-term current use of insulin ([facility] [address] 12/22/2020    Depression     Essential hypertension 8/20/2020    Family history of cardiovascular disease 8/20/2020    Family history of diabetes mellitus (DM) 8/20/2020    GERD (gastroesophageal reflux disease)     Plantar fasciitis, bilateral 8/20/2020    Pure hypercholesterolemia 12/22/2020    Seasonal rhinitis 8/20/2020    Strain of hamstring 8/20/2020    Vitamin D deficiency 8/20/2020     Past Surgical History:   Procedure Laterality Date    HX GYN      HX TUBAL LIGATION  1999    [place] ABDOMEN SURGERY PROC UNLISTED       Social History     Socioeconomic History    Marital status: SINGLE     Spouse name: Not on file    Number of children: Not on file    Years of education: Not on file    Highest education level: Not on file   Tobacco Use    Smoking status: Never Smoker    Smokeless tobacco: Never Used   Substance and Sexual Activity    Alcohol use: Not Currently    Drug use: Never     Social Determinants of Health     Financial Resource Strain:     Difficulty of Paying Living Expenses:    Food Insecurity:     Worried About Running Out of Food in the Last Year:     Ran Out of Food in the Last Year:    Transportation Needs:     Lack of Transportation (Medical):      Lack of Transportation (Non-Medical):    Physical Activity:     Days of Exercise per Week:     Minutes of Exercise per Session:    Stress:     Feeling of Stress :    Social Connections:     Frequency of Communication with Friends and Family:     Frequency of Social Gatherings with Friends and Family:     Attends Spiritism Services:     Active Member of Clubs or Organizations:     Attends Club or Organization Meetings:     Marital Status:      Family History   Problem Relation Age of Onset    Hypertension Mother     Diabetes Mother     Hypertension Father     Diabetes Father     Diabetes Sister     Hypertension Sister     Diabetes Brother     Hypertension Brother      Current Outpatient Medications   Medication Sig Dispense Refill    amoxicillin-clavulanate (AUGMENTIN) 875-125 mg per tablet Take 1 Tablet by mouth two (2) times a day. 14 Tablet 0    diclofenac EC (VOLTAREN) 75 mg EC tablet Take 1 Tablet by mouth two (2) times a day. One pill bid for 5 days then as needed with food 60 Tablet 0    escitalopram oxalate (LEXAPRO) 20 mg tablet Take 1 Tablet by mouth daily. 90 Tablet 0    lisinopril-hydroCHLOROthiazide (PRINZIDE, ZESTORETIC) 20-12.5 mg per tablet Take 1 Tablet by mouth Every morning. 90 Tablet 1    omeprazole (PRILOSEC) 40 mg capsule Take 1 Capsule by mouth daily. Take  1 capsule ,30 minutes before eating once a day 90 Capsule 0    fluticasone propionate (FLONASE) 50 mcg/actuation nasal spray 2 Sprays by Both Nostrils route daily. 1 Bottle 3    loratadine (CLARITIN) 10 mg tablet Take 1 Tab by mouth daily. 90 Tab 1    ferrous sulfate 325 mg (65 mg iron) tablet Take 1 Tab by mouth two (2) times a day.  180 Tab 1     No Known Allergies    Objective:  Visit Vitals  /84 (BP 1 Location: Left upper arm, BP Patient Position: Sitting, BP Cuff Size: Adult)   Pulse 72   Temp 98.1 °F (36.7 °C) (Oral)   Resp 18   Ht 5' 2\" (1.575 m)   Wt 227 lb 6.4 oz (103.1 kg)   SpO2 99%   BMI 41.59 kg/m²       Physical Exam:   Constitutional: General Appearance: Well dressed. Level of Distress: NAD. Psychiatric: Mental Status: normal mood and affect Orientation: to time, place, and person. ,normal eye contact. Poor insight  Head: Head: normocephalic and atraumatic. Eyes: Pupils: PERRLA. Sclerae: non-icteric. Neck: Neck: supple, trachea midline, and no masses. Lymph Nodes: no cervical LAD. Thyroid: no enlargement or nodules and non-tender. Lungs: Respiratory effort: no dyspnea. Auscultation: no wheezing, rales/crackles, or rhonchi and breath sounds normal and good air movement. Cardiovascular: Apical Impulse: not displaced. Heart Auscultation: normal S1 and S2; no murmurs, rubs, or gallops; and RRR. Neck vessels: no carotid bruits. Pulses including femoral / pedal: normal throughout. Abdomen: Bowel Sounds: normal. Inspection and Palpation: no tenderness, guarding, or masses and soft and non-distended. Liver: non-tender and no hepatomegaly. Spleen: non-tender and no splenomegaly. Musculoskeletal[de-identified] Extremities: no edema,no varicosities. No Calf tenderness. Neurologic: Gait and Station: normal gait and station. Motor Strength normal right and left. Skin: Inspection and palpation: no rash, lesions, or ulcer. Results for orders placed or performed in visit on 08/21/20   CBC WITH AUTOMATED DIFF   Result Value Ref Range    WBC 5.9 3.4 - 10.8 x10E3/uL    RBC 4.51 3.77 - 5.28 x10E6/uL    HGB 10.8 (L) 11.1 - 15.9 g/dL    HCT 33.7 (L) 34.0 - 46.6 %    MCV 75 (L) 79 - 97 fL    MCH 23.9 (L) 26.6 - 33.0 pg    MCHC 32.0 31.5 - 35.7 g/dL    RDW 14.4 11.7 - 15.4 %    PLATELET 906 342 - 082 x10E3/uL    NEUTROPHILS 58 Not Estab. %    Lymphocytes 32 Not Estab. %    MONOCYTES 9 Not Estab. %    EOSINOPHILS 1 Not Estab. %    BASOPHILS 0 Not Estab. %    ABS. NEUTROPHILS 3.4 1.4 - 7.0 x10E3/uL    Abs Lymphocytes 1.9 0.7 - 3.1 x10E3/uL    ABS. MONOCYTES 0.5 0.1 - 0.9 x10E3/uL    ABS. EOSINOPHILS 0.1 0.0 - 0.4 x10E3/uL    ABS.  BASOPHILS 0.0 0.0 - 0.2 x10E3/uL IMMATURE GRANULOCYTES 0 Not Estab. %    ABS. IMM. GRANS. 0.0 0.0 - 0.1 x10E3/uL   IRON PROFILE   Result Value Ref Range    TIBC 423 250 - 450 ug/dL    UIBC 394 131 - 425 ug/dL    Iron 29 27 - 159 ug/dL    Iron % saturation 7 (LL) 15 - 55 %   FERRITIN   Result Value Ref Range    Ferritin 13 (L) 15 - 150 ng/mL   VITAMIN D, 25 HYDROXY   Result Value Ref Range    VITAMIN D, 25-HYDROXY 18.6 (L) 30.0 - 100.0 ng/mL   HEMOGLOBIN A1C WITH EAG   Result Value Ref Range    Hemoglobin A1c 6.6 (H) 4.8 - 5.6 %    Estimated average glucose 672 mg/dL   METABOLIC PANEL, BASIC   Result Value Ref Range    Glucose 119 (H) 65 - 99 mg/dL    BUN 11 6 - 24 mg/dL    Creatinine 0.70 0.57 - 1.00 mg/dL    GFR est non- >59 mL/min/1.73    GFR est  >59 mL/min/1.73    BUN/Creatinine ratio 16 9 - 23    Sodium 141 134 - 144 mmol/L    Potassium 3.9 3.5 - 5.2 mmol/L    Chloride 103 96 - 106 mmol/L    CO2 24 20 - 29 mmol/L    Calcium 9.4 8.7 - 10.2 mg/dL   LIPID PANEL   Result Value Ref Range    Cholesterol, total 227 (H) 100 - 199 mg/dL    Triglyceride 85 0 - 149 mg/dL    HDL Cholesterol 77 >39 mg/dL    VLDL, calculated 17 5 - 40 mg/dL    LDL, calculated 133 (H) 0 - 99 mg/dL       Assessment/Plan:      ICD-10-CM ICD-9-CM    1. Essential hypertension  I10 401.9 CBC WITH AUTOMATED DIFF      METABOLIC PANEL, COMPREHENSIVE      TSH 3RD GENERATION   2. Type 2 diabetes mellitus with hyperglycemia, without long-term current use of insulin (HCC)  E11.65 250.00 HEMOGLOBIN A1C WITH EAG     790.29    3. Anxiety and depression  F41.9 300.00     F32.9 311    4. Pure hypercholesterolemia  E78.00 272.0 LIPID PANEL   5. Feeling of incomplete bladder emptying  R39.14 788.21 REFERRAL TO UROLOGY   6. Acute non-recurrent maxillary sinusitis  J01.00 461.0    7. Left knee pain, unspecified chronicity  M25.562 719.46    8. Anemia, unspecified type  D64.9 285.9    9. Gastroesophageal reflux disease without esophagitis  K21.9 530.81    10.  Allergic rhinitis, unspecified seasonality, unspecified trigger  J30.9 477.9    11. Morbid obesity ([facility] [address]  E66.01 278.01    12. Dribbling  N39.43 788.35 REFERRAL TO UROLOGY   13. Family history of cardiovascular disease  Z82.49 V17.49    14. Family history of diabetes mellitus (DM)  Z83.3 V18.0      Orders Placed This Encounter    CBC WITH AUTOMATED DIFF    METABOLIC PANEL, COMPREHENSIVE    LIPID PANEL    HEMOGLOBIN A1C WITH EAG    TSH 3RD GENERATION    REFERRAL TO UROLOGY     Referral Priority:   Routine     Referral Type:   Consultation     Referral Reason:   Specialty Services Required     Referred to Provider:   Vanessa Farrar MD     Requested Specialty:   Urology     Number of Visits Requested:   1    amoxicillin-clavulanate (AUGMENTIN) 875-125 mg per tablet     Sig: Take 1 Tablet by mouth two (2) times a day. Dispense:  14 Tablet     Refill:  0    diclofenac EC (VOLTAREN) 75 mg EC tablet     Sig: Take 1 Tablet by mouth two (2) times a day. One pill bid for 5 days then as needed with food     Dispense:  60 Tablet     Refill:  0       Hypertension controlled continued onLisinopril hydrochlorothiazide 20/12.5 mg once a day. Diet low in sodium with DASH diet. Labs ordered. Type 2 diabetes mellitus with hemoglobin A1c 6.6% she did not want to be on medicine so repeat labs ordered to touch base and to have baseline hemoglobin A1c diet low in starch sugar. She told me that she eats lots of fast food and junk food. She could not provide any history. She should have diet less than 1800 ADA diet and if she wants to lose weight she should have diet around 1200-calorie diet plan. Discussed about bariatric surgery. But before having bariatric surgery to have better and best outcome she should lose weight with healthy eating habits portion control calorie restricted diet diet low in starch sugar fat and walking minimum 30 to 45 minutes/day. Hypercholesteremia not taking any medicines.   Fasting lipid profile ordered. History of anxiety with depression taking escitalopram 20 mg/day. No negative thoughts. Allergic rhinitis with now having maxillary congestion and sinus pressure started on amoxicillin clavulanic acid for 1 week and take OTC probiotics and yogurt. Left knee pain recently admitted in joint and of hospital she had detailed work-up done and she was ruled out for blood clot. She is not a reliable good accurate history provider very vague in her history started on diclofenac 75 mg twice a day as needed take for 5 days then as needed side effects of long-term use of NSAIDs and diclofenac explained on liver, kidney, stomach, blood pressure and may cause fluid retention. She told me she does not remember the name of orthopedic surgeon that she follows at [address] She told me she has uterine polyps so she is going to have surgery with Dr. Francisco Muñoz by [address] [place] [address] physician for woman. We will try to get the notes. She told me she has incomplete emptying of bladder so I referred her to urologist Dr. Renuka Harmon. GERD taking omeprazole. She should have healthy dietary eating habits small frequent meals. Vitamin D deficiency she should take OTC vitamin D3 5000 unit/day. History of anemia might be due to heavy. menstruation in the past she used to take iron but not taking now. Labs ordered.    , Allergic rhinitis continue fluticasone nasal spray and loratadine. She does not have nagging cough that we can doubt on ACE inhibitor or lisinopril. I also recommended that if she wants to have second opinion or to change the primary care physician she can let me know. Refills given. Side effects discussed. BMI 41.59 weight loss planning discussed with conservative management and bariatric surgery. Answered all her questions. She did not provide the information regarding Covid vaccine. She is not helping anything in her history that we can come to the exact diagnosis. Follow-up in 3 months.   lose weight, increase physical activity, follow low fat diet, follow low salt diet, continue present plan, routine labs ordered, call if any problems    There are no Patient Instructions on file for this visit. Follow-up and Dispositions    · Return in about 3 months (around 10/14/2021) for follow up for chronic condition. fast lab.

## 2021-07-16 LAB
ALBUMIN SERPL-MCNC: 4.1 G/DL (ref 3.8–4.8)
ALBUMIN/GLOB SERPL: 1.4 {RATIO} (ref 1.2–2.2)
ALP SERPL-CCNC: 65 IU/L (ref 48–121)
ALT SERPL-CCNC: 12 IU/L (ref 0–32)
AST SERPL-CCNC: 18 IU/L (ref 0–40)
BASOPHILS # BLD AUTO: 0 X10E3/UL (ref 0–0.2)
BASOPHILS NFR BLD AUTO: 0 %
BILIRUB SERPL-MCNC: 0.3 MG/DL (ref 0–1.2)
BUN SERPL-MCNC: 9 MG/DL (ref 6–24)
BUN/CREAT SERPL: 13 (ref 9–23)
CALCIUM SERPL-MCNC: 9.3 MG/DL (ref 8.7–10.2)
CHLORIDE SERPL-SCNC: 101 MMOL/L (ref 96–106)
CHOLEST SERPL-MCNC: 241 MG/DL (ref 100–199)
CO2 SERPL-SCNC: 23 MMOL/L (ref 20–29)
CREAT SERPL-MCNC: 0.68 MG/DL (ref 0.57–1)
EOSINOPHIL # BLD AUTO: 0.1 X10E3/UL (ref 0–0.4)
EOSINOPHIL NFR BLD AUTO: 1 %
ERYTHROCYTE [DISTWIDTH] IN BLOOD BY AUTOMATED COUNT: 12.8 % (ref 11.7–15.4)
EST. AVERAGE GLUCOSE BLD GHB EST-MCNC: 140 MG/DL
GLOBULIN SER CALC-MCNC: 2.9 G/DL (ref 1.5–4.5)
GLUCOSE SERPL-MCNC: 120 MG/DL (ref 65–99)
HBA1C MFR BLD: 6.5 % (ref 4.8–5.6)
HCT VFR BLD AUTO: 38 % (ref 34–46.6)
HDLC SERPL-MCNC: 70 MG/DL
HGB BLD-MCNC: 12.2 G/DL (ref 11.1–15.9)
IMM GRANULOCYTES # BLD AUTO: 0 X10E3/UL (ref 0–0.1)
IMM GRANULOCYTES NFR BLD AUTO: 0 %
LDLC SERPL CALC-MCNC: 153 MG/DL (ref 0–99)
LYMPHOCYTES # BLD AUTO: 1.8 X10E3/UL (ref 0.7–3.1)
LYMPHOCYTES NFR BLD AUTO: 35 %
MCH RBC QN AUTO: 25.7 PG (ref 26.6–33)
MCHC RBC AUTO-ENTMCNC: 32.1 G/DL (ref 31.5–35.7)
MCV RBC AUTO: 80 FL (ref 79–97)
MONOCYTES # BLD AUTO: 0.6 X10E3/UL (ref 0.1–0.9)
MONOCYTES NFR BLD AUTO: 11 %
NEUTROPHILS # BLD AUTO: 2.8 X10E3/UL (ref 1.4–7)
NEUTROPHILS NFR BLD AUTO: 53 %
PLATELET # BLD AUTO: 269 X10E3/UL (ref 150–450)
POTASSIUM SERPL-SCNC: 4.1 MMOL/L (ref 3.5–5.2)
PROT SERPL-MCNC: 7 G/DL (ref 6–8.5)
RBC # BLD AUTO: 4.75 X10E6/UL (ref 3.77–5.28)
SODIUM SERPL-SCNC: 138 MMOL/L (ref 134–144)
TRIGL SERPL-MCNC: 104 MG/DL (ref 0–149)
TSH SERPL DL<=0.005 MIU/L-ACNC: 2.45 UIU/ML (ref 0.45–4.5)
VLDLC SERPL CALC-MCNC: 18 MG/DL (ref 5–40)
WBC # BLD AUTO: 5.3 X10E3/UL (ref 3.4–10.8)

## 2021-07-16 RX ORDER — ATORVASTATIN CALCIUM 10 MG/1
10 TABLET, FILM COATED ORAL
Qty: 90 TABLET | Refills: 1 | Status: SHIPPED | OUTPATIENT
Start: 2021-07-16 | End: 2022-05-19 | Stop reason: SDUPTHER

## 2021-07-16 RX ORDER — METFORMIN HYDROCHLORIDE 500 MG/1
500 TABLET ORAL DAILY
Qty: 90 TABLET | Refills: 1 | Status: SHIPPED | OUTPATIENT
Start: 2021-07-16 | End: 2021-12-22 | Stop reason: SDDI

## 2021-07-16 NOTE — PROGRESS NOTES
Please call  Ms. Rosy Lowery that she she is diabetic her cholesterol is up, she should be on atorvastatin 10 mg at bedtime and Metformin 500 mg once a day to be taken with meal to begin with, I may increase dose later on after repeating labs in 3 months.   Start writing food diary start eating healthy diet, she has to work hard, regarding the diet and the meal she has to work on it, otherwise she has to be on medicine, she has to stop eating junk food and fast food and what ever she likes she has to cut back starch sugar and fat and  unhealthy snacking and she has to start walking minimum 30 minutes 5 days a week in order to bring her sugar and cholesterol down rest of the labs are good thyroid function is good,

## 2021-09-08 LAB — SARS-COV-2, NAA: POSITIVE

## 2021-10-06 ENCOUNTER — OFFICE VISIT (OUTPATIENT)
Dept: ENT CLINIC | Age: 51
End: 2021-10-06
Payer: COMMERCIAL

## 2021-10-06 ENCOUNTER — TELEPHONE (OUTPATIENT)
Dept: ENT CLINIC | Age: 51
End: 2021-10-06

## 2021-10-06 VITALS
WEIGHT: 227 LBS | RESPIRATION RATE: 18 BRPM | OXYGEN SATURATION: 99 % | TEMPERATURE: 98.3 F | DIASTOLIC BLOOD PRESSURE: 80 MMHG | HEIGHT: 62 IN | SYSTOLIC BLOOD PRESSURE: 124 MMHG | BODY MASS INDEX: 41.77 KG/M2 | HEART RATE: 82 BPM

## 2021-10-06 DIAGNOSIS — J30.9 ALLERGIC RHINITIS, UNSPECIFIED SEASONALITY, UNSPECIFIED TRIGGER: Primary | ICD-10-CM

## 2021-10-06 DIAGNOSIS — R09.82 POST-NASAL DRIP: ICD-10-CM

## 2021-10-06 DIAGNOSIS — Z91.018 FOOD ALLERGY: ICD-10-CM

## 2021-10-06 PROCEDURE — 99203 OFFICE O/P NEW LOW 30 MIN: CPT | Performed by: NURSE PRACTITIONER

## 2021-10-06 RX ORDER — AZELASTINE 1 MG/ML
1 SPRAY, METERED NASAL DAILY
Qty: 1 EACH | Refills: 0 | Status: SHIPPED | OUTPATIENT
Start: 2021-10-06 | End: 2021-11-12

## 2021-10-06 NOTE — TELEPHONE ENCOUNTER
Pt saw NP Rob Encarnacion and was told to schedule a f/u with her in 4 weeks (preferably on a Friday) however, her schedule has not been released yet so I was unable to schedule her. Pt was told we would be in contact with her with an appointment day and time. Please Advise.

## 2021-10-06 NOTE — PROGRESS NOTES
Otolaryngology-Head and Neck Surgery  New Patient Visit     Patient: Lizette Beltran  YOB: 1970  MRN: [id number]  Date of Service: 10/6/2021    Chief Complaint: Sinus drainage    History of Present Illness: Lizette Beltran is a 49y.o. year old female who presents today for discussion of    Sinus drainage intermittently for several months. Associated with intermittent HA, itchy/watery eyes, pressure, and post nasal drip  Denies fever, congestion, sore throat, sick contact. Does not correlate with worsening in any particular season  Used Flonase, Claritin with no improvement  Never had allergy testing;concerned as can not eat seafood anymore because she becomes severely ill and breaks out in hives.   Denies Hx of ENT surgery      Past Medical History:  Past Medical History:   Diagnosis Date    Anemia     CHF (congestive heart failure) ([facility] [address] 8/20/2020    Controlled type 2 diabetes mellitus with complication, without long-term current use of insulin ([facility] [address] 12/22/2020    Depression     Essential hypertension 8/20/2020    Family history of cardiovascular disease 8/20/2020    Family history of diabetes mellitus (DM) 8/20/2020    GERD (gastroesophageal reflux disease)     Plantar fasciitis, bilateral 8/20/2020    Pure hypercholesterolemia 12/22/2020    Seasonal rhinitis 8/20/2020    Strain of hamstring 8/20/2020    Vitamin D deficiency 8/20/2020       Past Surgical History:   Past Surgical History:   Procedure Laterality Date    HX GYN      HX TUBAL LIGATION  1999    [place] ABDOMEN SURGERY PROC UNLISTED         Medications:   Current Outpatient Medications   Medication Instructions    amoxicillin-clavulanate (AUGMENTIN) 875-125 mg per tablet 1 Tablet, Oral, 2 TIMES DAILY    atorvastatin (LIPITOR) 10 mg, Oral, EVERY BEDTIME    diclofenac EC (VOLTAREN) 75 mg, Oral, 2 TIMES DAILY, One pill bid for 5 days then as needed with food    escitalopram oxalate (LEXAPRO) 20 mg, Oral, DAILY    ferrous sulfate 325 mg, Oral, 2 TIMES DAILY    fluticasone propionate (FLONASE) 50 mcg/actuation nasal spray 2 Sprays, Both Nostrils, DAILY    lisinopril-hydroCHLOROthiazide (PRINZIDE, ZESTORETIC) 20-12.5 mg per tablet 1 Tablet, Oral, EVERY MORNING    loratadine (CLARITIN) 10 mg, Oral, DAILY    metFORMIN (GLUCOPHAGE) 500 mg, Oral, DAILY, Take 1 pill once a day with large meal    omeprazole (PRILOSEC) 40 mg, Oral, DAILY, Take  1 capsule ,30 minutes before eating once a day       Allergies:   No Known Allergies    Social History:   Social History     Tobacco Use    Smoking status: Never Smoker    Smokeless tobacco: Never Used   Substance Use Topics    Alcohol use: Not Currently    Drug use: Never        Family History:  Family History   Problem Relation Age of Onset    Hypertension Mother     Diabetes Mother     Hypertension Father     Diabetes Father     Diabetes Sister     Hypertension Sister     Diabetes Brother     Hypertension Brother        Review of Systems:    Consitutional: denies fever, excessive weight gain or loss. Eyes: denies diplopia, eye pain. Integumentary: denies new concerning skin lesions. Ears, Nose, Mouth, Throat: denies except as per HPI.   Endocrine: denies hot or cold intolerance, increased thirst.  Respiratory: denies cough, hemoptysis, wheezing  Gastrointestinal: denies trouble swallowing, nausea, emesis, regurgitation  Musculoskeletal: denies muscle weakness or wasting  Cardiovascular: denies chest pain, shortness of breath  Neurologic: denies seizures, numbness or tingling, syncope  Hematologic: denies easy bleeding or bruising    Physical Examination:   Vitals:    10/06/21 1000   BP: 124/80   BP 1 Location: Left upper arm   BP Patient Position: Sitting   BP Cuff Size: Adult   Pulse: 82   Temp: 98.3 °F (36.8 °C)   TempSrc: Temporal   Resp: 18   Height: 5' 2\" (1.575 m)   Weight: 227 lb (103 kg)   SpO2: 99%        General: Comfortable, pleasant, appears stated age  Voice: Strong, speaking in full sentences, no stridor    Face: No masses or lesions, facial strength symmetric  Eyes: Dennie's lines noted bilateral eyes. Ears: External ears unremarkable. Bilateral ear canal clear. Tympanic membrane clear and intact, with visible landmarks. Clear middle ear space  Nose: External nose unremarkable. Dorsum midline. Anterior rhinoscopy demonstrates no lesions. Septum midline. Turbinates pale and edematous. Oral Cavity / Oropharynx: No trismus. Mucosa pink and moist. No lesions. Tongue is midline and mobile. Palate elevates symmetrically. Uvula midline. Tonsils unremarkable. Base of tongue soft. Floor of mouth soft. Neck: Supple. No adenopathy. Thyroid unremarkable. Palpable laryngeal landmarks. Full neck range of motion   Neurologic: CN II - XI intact. Normal gait      Assessment and Plan:   1. Allergic Rhinitis   2. Post nasal drip  3. Food allergy, unspecified    Will switch to Zyrtec as she has been on Claritin for a long time. Continue Flonase and will add Astelin daily. We will also try saline rinses. Will refer to allergist as she is concerned about her issues with seafood as well as her environmental allergens. She would like to have testing done in 1 location. Return to office in 4 weeks. The patient was instructed to return to clinic if no improvement or progression of symptoms. Signs to watch out for reviewed.       Chuck Hummel MSN, FNP-C  [facility]  [address] [place] [address]  Office Phone: [phone]

## 2021-10-06 NOTE — PROGRESS NOTES
Visit Vitals  /80 (BP 1 Location: Left upper arm, BP Patient Position: Sitting, BP Cuff Size: Adult)   Pulse 82   Temp 98.3 °F (36.8 °C) (Temporal)   Resp 18   Ht 5' 2\" (1.575 m)   Wt 227 lb (103 kg)   SpO2 99%   BMI 41.52 kg/m²     Chief Complaint   Patient presents with    New Patient     sinus issues

## 2021-11-12 ENCOUNTER — OFFICE VISIT (OUTPATIENT)
Dept: ENT CLINIC | Age: 51
End: 2021-11-12
Payer: COMMERCIAL

## 2021-11-12 VITALS
RESPIRATION RATE: 18 BRPM | OXYGEN SATURATION: 98 % | WEIGHT: 226 LBS | DIASTOLIC BLOOD PRESSURE: 82 MMHG | HEIGHT: 62 IN | TEMPERATURE: 97.4 F | HEART RATE: 97 BPM | BODY MASS INDEX: 41.59 KG/M2 | SYSTOLIC BLOOD PRESSURE: 134 MMHG

## 2021-11-12 DIAGNOSIS — M26.609 TEMPOROMANDIBULAR JOINT DISORDER (TMJ): ICD-10-CM

## 2021-11-12 DIAGNOSIS — K11.20 SIALADENITIS: ICD-10-CM

## 2021-11-12 DIAGNOSIS — R59.0 CERVICAL ADENOPATHY: ICD-10-CM

## 2021-11-12 DIAGNOSIS — J30.9 ALLERGIC RHINITIS, UNSPECIFIED SEASONALITY, UNSPECIFIED TRIGGER: Primary | ICD-10-CM

## 2021-11-12 PROCEDURE — 99214 OFFICE O/P EST MOD 30 MIN: CPT | Performed by: NURSE PRACTITIONER

## 2021-11-12 RX ORDER — AMOXICILLIN AND CLAVULANATE POTASSIUM 875; 125 MG/1; MG/1
1 TABLET, FILM COATED ORAL 2 TIMES DAILY
Qty: 20 TABLET | Refills: 0 | Status: SHIPPED | OUTPATIENT
Start: 2021-11-12 | End: 2021-11-22

## 2021-11-12 RX ORDER — AZELASTINE 1 MG/ML
1 SPRAY, METERED NASAL 2 TIMES DAILY
Qty: 1 EACH | Refills: 1 | Status: SHIPPED | OUTPATIENT
Start: 2021-11-12 | End: 2022-06-20 | Stop reason: SDUPTHER

## 2021-11-12 NOTE — PROGRESS NOTES
Otolaryngology-Head and Neck Surgery  Follow Up Patient Visit     Patient: Carina Browne  YOB: 1970  MRN: [id number]  Date of Service:  11/12/2021    Chief Complaint: PND    Interval HX: Sinus drainage intermittently for several months. Associated with intermittent HA, itchy/watery eyes, pressure, and post nasal drip  Used Flonase, Claritin with no improvement  Never had allergy testing;concerned as can not eat seafood anymore because she becomes severely ill and breaks out in hives. switched to Zyrtec as she has been on Claritin for a long time. added Astelin and nasal saline daily. refered to allergist as she wanted testing in 1 location for food/environmental.    History of Present Illness: Carina Browne is a 49y.o. year old female who was last seen 10/6/2021 for sinus drainage. she presents today for medication follow up. Reports improved using Flonase, Astelin, Zyrtec, and nasal saline daily. Has appointment with allergist next week. Only issue is increased sinus pressure and drainage over past week. Associated symptoms are left otalgia, foul taste, left swollen gland. Denies otorrhea, fever, dizziness.             Past Medical History:  Past Medical History:   Diagnosis Date    Anemia     CHF (congestive heart failure) ([address] 8/20/2020    Controlled type 2 diabetes mellitus with complication, without long-term current use of insulin ([address] 12/22/2020    Depression     Essential hypertension 8/20/2020    Family history of cardiovascular disease 8/20/2020    Family history of diabetes mellitus (DM) 8/20/2020    GERD (gastroesophageal reflux disease)     Plantar fasciitis, bilateral 8/20/2020    Pure hypercholesterolemia 12/22/2020    Seasonal rhinitis 8/20/2020    Strain of hamstring 8/20/2020    Vitamin D deficiency 8/20/2020       Past Surgical History:   Past Surgical History:   Procedure Laterality Date    HX GYN      HX TUBAL LIGATION  1999    FL ABDOMEN SURGERY PROC UNLISTED         Medications:   Current Outpatient Medications   Medication Instructions    amoxicillin-clavulanate (Augmentin) 875-125 mg per tablet 1 Tablet, Oral, 2 TIMES DAILY    atorvastatin (LIPITOR) 10 mg, Oral, EVERY BEDTIME    azelastine (ASTELIN) 137 mcg (0.1 %) nasal spray 1 [place], Both Nostrils, DAILY, Use in each nostril as directed    diclofenac EC (VOLTAREN) 75 mg, Oral, 2 TIMES DAILY, One pill bid for 5 days then as needed with food    escitalopram oxalate (LEXAPRO) 20 mg, Oral, DAILY    ferrous sulfate 325 mg, Oral, 2 TIMES DAILY    fluticasone propionate (FLONASE) 50 mcg/actuation nasal spray 2 Sprays, Both Nostrils, DAILY    lisinopril-hydroCHLOROthiazide (PRINZIDE, ZESTORETIC) 20-12.5 mg per tablet 1 Tablet, Oral, EVERY MORNING    loratadine (CLARITIN) 10 mg, Oral, DAILY    metFORMIN (GLUCOPHAGE) 500 mg, Oral, DAILY, Take 1 pill once a day with large meal    omeprazole (PRILOSEC) 40 mg, Oral, DAILY, Take  1 capsule ,30 minutes before eating once a day       Allergies:   No Known Allergies    Social History:   Social History     Tobacco Use    Smoking status: Never Smoker    Smokeless tobacco: Never Used   Substance Use Topics    Alcohol use: Not Currently    Drug use: Never       Family History:  Family History   Problem Relation Age of Onset    Hypertension Mother     Diabetes Mother     Hypertension Father     Diabetes Father     Diabetes Sister     Hypertension Sister     Diabetes Brother     Hypertension Brother        Review of Systems:  Consitutional: denies fever, excessive weight gain or loss. Eyes: denies diplopia, eye pain. Integumentary: denies new concerning skin lesions. Ears, Nose, Mouth, Throat: denies except as per HPI.   Endocrine: denies hot or cold intolerance, increased thirst.  Respiratory: denies cough, hemoptysis, wheezing  Gastrointestinal: denies trouble swallowing, nausea, emesis, regurgitation  Musculoskeletal: denies muscle weakness or wasting  Cardiovascular: denies chest pain, shortness of breath  Neurologic: denies seizures, numbness or tingling, syncope  Hematologic: denies easy bleeding or bruising    Physical Examination:   Vitals:    11/12/21 0959   BP: 134/82   BP 1 Location: Left upper arm   BP Patient Position: Sitting   BP Cuff Size: Large adult   Pulse: 97   Temp: 97.4 °F (36.3 °C)   TempSrc: Temporal   Resp: 18   Height: 5' 2\" (1.575 m)   Weight: 226 lb (102.5 kg)   SpO2: 98%         General: Comfortable, pleasant, appears stated age  Voice: Strong, speaking in full sentences, no stridor    Face: No masses or lesions, facial strength symmetric. Temporomandibular tenderness upon palpation of the joint. Ears: External ears unremarkable. Bilateral ear canal clear. Tympanic membrane clear and intact, with visible landmarks. Clear middle ear space  Nose: External nose unremarkable. Dorsum midline. Anterior rhinoscopy demonstrates no lesions. Septum midline. Turbinates without hypertrophy. Oral Cavity / Oropharynx: No trismus. Mucosa pink and moist. No lesions. Tongue is midline and mobile. Palate elevates symmetrically. Uvula midline. Tonsils unremarkable. Base of tongue soft. Floor of mouth soft. Foul taste reported with massage of parotid and Stensen gland, no exudate noted tenderness upon palpation of left parotid gland. Neck: Supple. Tenderness upon palpation to left submandibular gland. thyroid unremarkable. Palpable laryngeal landmarks. Full neck range of motion   Neurologic: CN II - XI intact. Normal gait      Assessment and Plan:   1. Allergic Rhinitis  2. TMJ disorder  3. Sialadenitis  4.  Cervical adenopathy    -Continue current medication regimen: Flonase, Astelin, Zyrtec, nasal saline.  -Discussed conservative management of TMJ: Compress, soft diet, alternate sides of chewing, no gum.  -Will trial Augmentin x10 days for adenopathy as well as possible start of infection of parotid gland.  -Follow-up in 3 to 4 weeks after treatment and allergist appointment. The patient was instructed to return to clinic if no improvement or progression of symptoms. Signs to watch out for reviewed.       Charu HECK, FNP-C  [facility]  [address] [place] [address]  Office Phone: [phone]

## 2021-11-12 NOTE — PROGRESS NOTES
Visit Vitals  /82 (BP 1 Location: Left upper arm, BP Patient Position: Sitting, BP Cuff Size: Large adult)   Pulse 97   Temp 97.4 °F (36.3 °C) (Temporal)   Resp 18   Ht 5' 2\" (1.575 m)   Wt 226 lb (102.5 kg)   SpO2 98%   BMI 41.34 kg/m²     Chief Complaint   Patient presents with    Follow-up     Recheck Allergic Rhinitis and PND   1. Have you been to the ER, urgent care clinic since your last visit? Hospitalized since your last visit? No    2. Have you seen or consulted any other health care providers outside of the [address] [place] [address] since your last visit? Include any pap smears or colon screening.  No

## 2021-12-03 ENCOUNTER — OFFICE VISIT (OUTPATIENT)
Dept: ENT CLINIC | Age: 51
End: 2021-12-03
Payer: COMMERCIAL

## 2021-12-03 VITALS
SYSTOLIC BLOOD PRESSURE: 136 MMHG | BODY MASS INDEX: 42.33 KG/M2 | RESPIRATION RATE: 16 BRPM | TEMPERATURE: 97.8 F | HEART RATE: 99 BPM | OXYGEN SATURATION: 98 % | WEIGHT: 230 LBS | HEIGHT: 62 IN | DIASTOLIC BLOOD PRESSURE: 88 MMHG

## 2021-12-03 DIAGNOSIS — Z91.09 ENVIRONMENTAL ALLERGIES: ICD-10-CM

## 2021-12-03 DIAGNOSIS — J30.9 ALLERGIC RHINITIS, UNSPECIFIED SEASONALITY, UNSPECIFIED TRIGGER: Primary | ICD-10-CM

## 2021-12-03 DIAGNOSIS — Z91.018 MULTIPLE FOOD ALLERGIES: ICD-10-CM

## 2021-12-03 PROCEDURE — 99213 OFFICE O/P EST LOW 20 MIN: CPT | Performed by: NURSE PRACTITIONER

## 2021-12-03 NOTE — PROGRESS NOTES
Otolaryngology-Head and Neck Surgery  Follow Up Patient Visit     Patient: Wendi Bermudez  YOB: 1970  MRN: [id number]  Date of Service:  12/3/2021    Chief Complaint: Rhinorrhea, PND    Seen 11/12/2021: increased sinus pressure and drainage x1 week. Associated symptoms are left otalgia, foul taste, left swollen gland. Denies otorrhea, fever, dizziness. Using Flonase, zyrtec, Astelin    Discussed conservative management of TMJ: Compress, soft diet, alternate sides of chewing, no gum. RX Augmentin x10 days for adenopathy as well as possible start of infection of parotid gland. History of Present Illness: Wendi Bermudez is a 49y.o. year old female who was last seen 11/12/2021for PND. she presents today for follow up. Reports completely better. Had allergist apt 1 week after LOV, found multiple food and environmental allergies and will be starting immunotherapy weekly. (not sure when)  No new complaints.       Past Medical History:  Past Medical History:   Diagnosis Date    Anemia     CHF (congestive heart failure) ([address] 8/20/2020    Controlled type 2 diabetes mellitus with complication, without long-term current use of insulin ([address] 12/22/2020    Depression     Essential hypertension 8/20/2020    Family history of cardiovascular disease 8/20/2020    Family history of diabetes mellitus (DM) 8/20/2020    GERD (gastroesophageal reflux disease)     Plantar fasciitis, bilateral 8/20/2020    Pure hypercholesterolemia 12/22/2020    Seasonal rhinitis 8/20/2020    Strain of hamstring 8/20/2020    Vitamin D deficiency 8/20/2020       Past Surgical History:   Past Surgical History:   Procedure Laterality Date    HX GYN      HX TUBAL LIGATION  1999    LA ABDOMEN SURGERY PROC UNLISTED         Medications:   Current Outpatient Medications   Medication Instructions    atorvastatin (LIPITOR) 10 mg, Oral, EVERY BEDTIME    azelastine (ASTELIN) 137 mcg (0.1 %) nasal spray 1 Spray, Both Nostrils, 2 TIMES DAILY, Start once daily before bed, increase to BID if helpful    escitalopram oxalate (LEXAPRO) 20 mg, Oral, DAILY    ferrous sulfate 325 mg, Oral, 2 TIMES DAILY    fluticasone propionate (FLONASE) 50 mcg/actuation nasal spray 2 Sprays, Both Nostrils, DAILY    lisinopril-hydroCHLOROthiazide (PRINZIDE, ZESTORETIC) 20-12.5 mg per tablet 1 Tablet, Oral, EVERY MORNING    loratadine (CLARITIN) 10 mg, Oral, DAILY    metFORMIN (GLUCOPHAGE) 500 mg, Oral, DAILY, Take 1 pill once a day with large meal    omeprazole (PRILOSEC) 40 mg, Oral, DAILY, Take  1 capsule ,30 minutes before eating once a day       Allergies:   No Known Allergies    Social History:   Social History     Tobacco Use    Smoking status: Never Smoker    Smokeless tobacco: Never Used   Substance Use Topics    Alcohol use: Not Currently    Drug use: Never       Family History:  Family History   Problem Relation Age of Onset    Hypertension Mother     Diabetes Mother     Hypertension Father     Diabetes Father     Diabetes Sister     Hypertension Sister     Diabetes Brother     Hypertension Brother        Review of Systems:  Consitutional: denies fever, excessive weight gain or loss. Eyes: denies diplopia, eye pain. Integumentary: denies new concerning skin lesions. Ears, Nose, Mouth, Throat: denies except as per HPI.   Endocrine: denies hot or cold intolerance, increased thirst.  Respiratory: denies cough, hemoptysis, wheezing  Gastrointestinal: denies trouble swallowing, nausea, emesis, regurgitation  Musculoskeletal: denies muscle weakness or wasting  Cardiovascular: denies chest pain, shortness of breath  Neurologic: denies seizures, numbness or tingling, syncope  Hematologic: denies easy bleeding or bruising    Physical Examination:   Vitals:    12/03/21 1043   BP: 136/88   BP 1 Location: Left upper arm   BP Patient Position: Sitting   BP Cuff Size: Large adult   Pulse: 99   Temp: 97.8 °F (36.6 °C)   TempSrc: Temporal Resp: 16   Height: 5' 2\" (1.575 m)   Weight: 230 lb (104.3 kg)   SpO2: 98%         General: Comfortable, pleasant, appears stated age  Voice: Strong, speaking in full sentences, no stridor    Face: No masses or lesions, facial strength symmetric   Ears: External ears unremarkable. Bilateral ear canal clear. Tympanic membrane clear and intact, with visible landmarks. Clear middle ear space  Nose: External nose unremarkable. Dorsum midline. Anterior rhinoscopy demonstrates no lesions. Septum midline. Turbinates without hypertrophy. Oral Cavity / Oropharynx: No trismus. Mucosa pink and moist. No lesions. Tongue is midline and mobile. Palate elevates symmetrically. Uvula midline. Tonsils unremarkable. Base of tongue soft. Floor of mouth soft. Neck: Supple. No adenopathy. Thyroid unremarkable. Palpable laryngeal landmarks. Full neck range of motion   Neurologic: CN II - XI intact. Normal gait      Assessment and Plan:   1. Allergic rhinitis   2. Environmental allergies  3. Food allergies    -Continue current medication regimen: Flonase, Astelin, Zyrtec, saline  -Patient will provide allergy testing results. -Return to office as needed and/or in 1 year.          Karina Loya MSN, FNP-C  [facility]  [address] [place] [address] 6  [facility]  Office Phone: [phone] Statement Selected

## 2021-12-03 NOTE — PROGRESS NOTES
Visit Vitals  /88 (BP 1 Location: Left upper arm, BP Patient Position: Sitting, BP Cuff Size: Large adult)   Pulse 99   Temp 97.8 °F (36.6 °C) (Temporal)   Resp 16   Ht 5' 2\" (1.575 m)   Wt 230 lb (104.3 kg)   SpO2 98%   BMI 42.07 kg/m²     Chief Complaint   Patient presents with    Follow-up     Allergic Rhinitis / Sialadenitis / TMJ   1. Have you been to the ER, urgent care clinic since your last visit? Hospitalized since your last visit? No    2. Have you seen or consulted any other health care providers outside of the [address] [place] [address] since your last visit? Include any pap smears or colon screening.  No

## 2021-12-20 ENCOUNTER — TELEPHONE (OUTPATIENT)
Dept: INTERNAL MEDICINE CLINIC | Age: 51
End: 2021-12-20

## 2021-12-20 RX ORDER — LISINOPRIL AND HYDROCHLOROTHIAZIDE 12.5; 2 MG/1; MG/1
1 TABLET ORAL EVERY MORNING
Qty: 90 TABLET | Refills: 0 | Status: SHIPPED | OUTPATIENT
Start: 2021-12-20 | End: 2022-03-28

## 2021-12-20 NOTE — TELEPHONE ENCOUNTER
Patient came in today needs a refill on  lisinopril-hydroCHLOROthiazide (PRINZIDE, ZESTORETIC) 20-12.5 mg per tablet  [facility]    LOV 7/14/21 NOV 12/22/21

## 2021-12-21 PROBLEM — E11.9 CONTROLLED TYPE 2 DIABETES MELLITUS WITHOUT COMPLICATION, WITHOUT LONG-TERM CURRENT USE OF INSULIN (HCC): Status: ACTIVE | Noted: 2020-12-22

## 2021-12-22 ENCOUNTER — OFFICE VISIT (OUTPATIENT)
Dept: INTERNAL MEDICINE CLINIC | Age: 51
End: 2021-12-22
Payer: COMMERCIAL

## 2021-12-22 VITALS
HEART RATE: 84 BPM | BODY MASS INDEX: 41.41 KG/M2 | OXYGEN SATURATION: 98 % | WEIGHT: 225 LBS | SYSTOLIC BLOOD PRESSURE: 154 MMHG | RESPIRATION RATE: 12 BRPM | HEIGHT: 62 IN | DIASTOLIC BLOOD PRESSURE: 100 MMHG

## 2021-12-22 DIAGNOSIS — E66.01 MORBID OBESITY (HCC): ICD-10-CM

## 2021-12-22 DIAGNOSIS — E55.9 VITAMIN D DEFICIENCY: ICD-10-CM

## 2021-12-22 DIAGNOSIS — J30.9 ALLERGIC RHINITIS, UNSPECIFIED SEASONALITY, UNSPECIFIED TRIGGER: ICD-10-CM

## 2021-12-22 DIAGNOSIS — D64.9 ANEMIA, UNSPECIFIED TYPE: ICD-10-CM

## 2021-12-22 DIAGNOSIS — I10 ESSENTIAL HYPERTENSION: ICD-10-CM

## 2021-12-22 DIAGNOSIS — E11.9 CONTROLLED TYPE 2 DIABETES MELLITUS WITHOUT COMPLICATION, WITHOUT LONG-TERM CURRENT USE OF INSULIN (HCC): ICD-10-CM

## 2021-12-22 DIAGNOSIS — E78.00 PURE HYPERCHOLESTEROLEMIA: ICD-10-CM

## 2021-12-22 DIAGNOSIS — M72.2 PLANTAR FASCIITIS, BILATERAL: ICD-10-CM

## 2021-12-22 DIAGNOSIS — M62.830 MUSCLE SPASM OF BACK: ICD-10-CM

## 2021-12-22 LAB — GLUCOSE POC: 107 MG/DL

## 2021-12-22 PROCEDURE — 99214 OFFICE O/P EST MOD 30 MIN: CPT | Performed by: INTERNAL MEDICINE

## 2021-12-22 PROCEDURE — 82962 GLUCOSE BLOOD TEST: CPT | Performed by: INTERNAL MEDICINE

## 2021-12-22 RX ORDER — MELOXICAM 15 MG/1
15 TABLET ORAL DAILY
Qty: 30 TABLET | Refills: 0 | Status: SHIPPED | OUTPATIENT
Start: 2021-12-22

## 2021-12-22 RX ORDER — CYCLOBENZAPRINE HCL 10 MG
10 TABLET ORAL
Qty: 30 TABLET | Refills: 0 | Status: SHIPPED | OUTPATIENT
Start: 2021-12-22

## 2021-12-22 NOTE — PROGRESS NOTES
Shane Dillon is a 49 y.o. female and presents with Follow Up Chronic Condition, Hypertension, Diabetes (107 in office ), and Pain (Chronic) (Back)      Ms. Saniya Elmore came for regular follow-up. Usually she come for acute sick visit also, and today she told me she has pain in her lower back and upper back and right shoulder and she needs excuse from work. 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 She is not a good historian. Very noncompliant for regular follow-up in between also she missed that appointment and she will usually come to get the refills ordered for acute visit and explained that she should come regularly. She had her labs done in July. Her hemoglobin A1c was 6.5% her lipid profile was elevated with total cholesterol 241 and . In between she has consulted allergy specialist and has been started on EpiPen, for the allergy to shellfish. And she is getting nasal spray. Today I have started her on cyclobenzaprine and meloxicam and meloxicam should not be taken on a daily basis which can affect her kidney liver, and stomach and blood pressure. No good insight for her medical problems. She told me she cannot take Metformin causing diarrhea. I ordered labs. In the past she had low vitamin D level. She had echo in June 2021 at  [facility] with EF 65 to 70% with grade 1 diastolic dysfunction and pulmonary artery pressure around 34. Not providing good history. He is up-to-date with her 2 doses of Covid vaccine.       Review of Systems    ROS     Past Medical History:   Diagnosis Date    Anemia     CHF (congestive heart failure) ([address] 8/20/2020    Controlled type 2 diabetes mellitus with complication, without long-term current use of insulin ([address] 12/22/2020    Depression     Essential hypertension 8/20/2020    Family history of cardiovascular disease 8/20/2020    Family history of diabetes mellitus (DM) 8/20/2020    GERD (gastroesophageal reflux disease)     Plantar fasciitis, bilateral 8/20/2020    Pure hypercholesterolemia 12/22/2020    Seasonal rhinitis 8/20/2020    Strain of hamstring 8/20/2020    Vitamin D deficiency 8/20/2020     Past Surgical History:   Procedure Laterality Date    HX GYN      HX TUBAL LIGATION  1999    [place] ABDOMEN SURGERY PROC UNLISTED       Social History     Socioeconomic History    Marital status: SINGLE   Tobacco Use    Smoking status: Never Smoker    Smokeless tobacco: Never Used   Substance and Sexual Activity    Alcohol use: Not Currently    Drug use: Never     Family History   Problem Relation Age of Onset    Hypertension Mother     Diabetes Mother     Hypertension Father     Diabetes Father     Diabetes Sister     Hypertension Sister     Diabetes Brother     Hypertension Brother      Current Outpatient Medications   Medication Sig Dispense Refill    cyclobenzaprine (FLEXERIL) 10 mg tablet Take 1 Tablet by mouth nightly as needed for Muscle Spasm(s). 30 Tablet 0    meloxicam (MOBIC) 15 mg tablet Take 1 Tablet by mouth daily. Take for 1 week for 1 week then as needed with food 30 Tablet 0    lisinopril-hydroCHLOROthiazide (PRINZIDE, ZESTORETIC) 20-12.5 mg per tablet Take 1 Tablet by mouth Every morning. 90 Tablet 0    azelastine (ASTELIN) 137 mcg (0.1 %) nasal spray 1 [place] by Both Nostrils route two (2) times a day. Start once daily before bed, increase to BID if helpful 1 Each 1    atorvastatin (LIPITOR) 10 mg tablet Take 1 Tablet by mouth nightly. 90 Tablet 1    escitalopram oxalate (LEXAPRO) 20 mg tablet Take 1 Tablet by mouth daily. 90 Tablet 0    omeprazole (PRILOSEC) 40 mg capsule Take 1 Capsule by mouth daily. Take  1 capsule ,30 minutes before eating once a day 90 Capsule 0    fluticasone propionate (FLONASE) 50 mcg/actuation nasal spray 2 Sprays by Both Nostrils route daily. 1 Bottle 3    loratadine (CLARITIN) 10 mg tablet Take 1 Tab by mouth daily.  90 Tab 1    ferrous sulfate 325 mg (65 mg iron) tablet Take 1 Tab by mouth two (2) times a day. 180 Tab 1     Allergies   Allergen Reactions    Shellfish Derived Anaphylaxis       Objective:  Visit Vitals  BP (!) 154/100 (BP 1 Location: Left upper arm, BP Patient Position: Sitting, BP Cuff Size: Large adult)   Pulse 84   Resp 12   Ht 5' 2\" (1.575 m)   Wt 225 lb (102.1 kg)   SpO2 98%   BMI 41.15 kg/m²       Physical Exam:   Constitutional: General Appearance: . Level of Distress: NAD. Psychiatric: Mental Status: normal mood and affect Orientation: to time, place, and person. ,normal eye contact. Head: Head: normocephalic and atraumatic. Eyes: Pupils: PERRLA. Sclerae: non-icteric. Neck: Neck: supple, trachea midline, and no masses. Lymph Nodes: no cervical LAD. Thyroid: no enlargement or nodules and non-tender. Lungs: Respiratory effort: no dyspnea. Auscultation: no wheezing, rales/crackles, or rhonchi and breath sounds normal and good air movement. Cardiovascular: Apical Impulse: not displaced. Heart Auscultation: normal S1 and S2; no murmurs, rubs, or gallops; and RRR. Neck vessels: no carotid bruits. Pulses including femoral / pedal: normal throughout. Abdomen: Bowel Sounds: normal. Inspection and Palpation: no tenderness, guarding, or masses and soft and non-distended. Liver: non-tender and no hepatomegaly. Spleen: non-tender and no splenomegaly. Musculoskeletal[de-identified] Extremities: no edema,no varicosities. No Calf tenderness. Neurologic: Gait and Station: normal gait and station. Motor Strength normal right and left. Skin: Inspection and palpation: no rash, lesions, or ulcer. Results for orders placed or performed in visit on 12/22/21   AMB POC GLUCOSE BLOOD, BY GLUCOSE MONITORING DEVICE   Result Value Ref Range    Glucose  MG/DL       Assessment/Plan:      ICD-10-CM ICD-9-CM    1. Essential hypertension  I10 401.9 CBC WITH AUTOMATED DIFF      METABOLIC PANEL, COMPREHENSIVE   2. Pure hypercholesterolemia  E78.00 272.0 LIPID PANEL   3. Muscle spasm of back  M62.830 724.8    4. Vitamin D deficiency  E55.9 268.9 VITAMIN D, 25 HYDROXY   5. Allergic rhinitis, unspecified seasonality, unspecified trigger  J30.9 477.9    6. Anemia, unspecified type  D64.9 285.9    7. Controlled type 2 diabetes mellitus without complication, without long-term current use of insulin (HCC)  E11.9 250.00 AMB POC GLUCOSE BLOOD, BY GLUCOSE MONITORING DEVICE      CBC WITH AUTOMATED DIFF      METABOLIC PANEL, COMPREHENSIVE      HEMOGLOBIN A1C WITH EAG      TSH 3RD GENERATION   8. Plantar fasciitis, bilateral  M72.2 728.71    9. Morbid obesity ([facility]  E66.01 278.01    10. BMI 40.0-44.9, adult ([facility]  Z68.41 V85.41      Orders Placed This Encounter    CBC WITH AUTOMATED DIFF    METABOLIC PANEL, COMPREHENSIVE    LIPID PANEL    HEMOGLOBIN A1C WITH EAG    TSH 3RD GENERATION    VITAMIN D, 25 HYDROXY    AMB POC GLUCOSE BLOOD, BY GLUCOSE MONITORING DEVICE    cyclobenzaprine (FLEXERIL) 10 mg tablet     Sig: Take 1 Tablet by mouth nightly as needed for Muscle Spasm(s). Dispense:  30 Tablet     Refill:  0    meloxicam (MOBIC) 15 mg tablet     Sig: Take 1 Tablet by mouth daily. Take for 1 week for 1 week then as needed with food     Dispense:  30 Tablet     Refill:  0       Hypertension uncontrolled she has not taken her antihypertensive she is noncompliant. Continued on lisinopril hydrochlorothiazide 20/12.5 mg/day with diet low in sodium. Hypercholesteremia taking atorvastatin 10 mg at bedtime. Recommended to take diet low in fat. History of anxiety with depression taking Lexapro 20 mg once a day. GERD taking omeprazole. Allergic rhinitis taking fluticasone nasal spray and loratadine and has consulted allergy specialist and getting EpiPen and she told me her  nasal drops are change but she could not recall.     Back pain with muscle spasm started on meloxicam and not to be taken for long-term use explained about long-term use of meloxicam like NSAIDs she can have side effects on kidney, liver, stomach, blood pressure so avoid long-term use and she should not take OTC ibuprofen or Aleve when getting meloxicam.  Started on cyclobenzaprine 10 mg at bedtime as needed can cause drowsiness I have kept her out of work for today and tomorrow she will resume her work from Monday. After 5 days. She should resume her work from Monday. she usually come for her acute visit explained that she should come for her regular follow-up because she has multiple concerns. Vitamin D level ordered. She also takes azelastine nasal spray. She also has plantar fasciitis. If needed I will refer her to orthopedic surgeon. Type 2 diabetes mellitus with hemoglobin A1c 6.5% she is not taking Metformin was recommended to take once a day but she told me that she has stopped taking it so repeat labs ordered diet low in starch and sugar. Her BMI is 41.15 she needs to lose weight to prevent musculoskeletal pain and activity modification. She had a venous Doppler in both lower extremities in June 2021 when she was admitted at [facility] and that was negative for blood clot and she had knee effusion. She needs to see orthopedic surgeon. Labs ordered. Follow-up in 4 to 6 weeks. Refills given side effects discussed excuse from work given for today tomorrow and Friday is a X[place] day and Saturday Sunday she does not work. She will resume from Monday. lose weight, bring BP log to office visit, follow low fat diet, follow low salt diet, continue present plan, routine labs ordered, call if any problems    There are no Patient Instructions on file for this visit. Follow-up and Dispositions    · Return in about 5 weeks (around 1/26/2022) for follow up for chronic condition garrick kunz 30 minutes has lots of complaints.

## 2021-12-22 NOTE — PROGRESS NOTES
Chief Complaint   Patient presents with    Follow Up Chronic Condition    Hypertension    Diabetes     107 in office     Pain (Chronic)     Back     Visit Vitals  BP (!) 157/93 (BP 1 Location: Right arm, BP Patient Position: Sitting, BP Cuff Size: Adult)   Pulse 89   Resp 12   Ht 5' 2\" (1.575 m)   Wt 225 lb (102.1 kg)   SpO2 98%   BMI 41.15 kg/m²     1. Have you been to the ER, urgent care clinic since your last visit? Hospitalized since your last visit? No    2. Have you seen or consulted any other health care providers outside of the [address] [place] [address] since your last visit? Include any pap smears or colon screening.  No

## 2021-12-22 NOTE — LETTER
NOTIFICATION RETURN TO WORK / SCHOOL    12/22/2021 3:18 PM    Ms. Rosalind Wyatt  [address] [facility] [address]         To Whom It May Concern:    Rosalind Wyatt is currently under the care of [address] [place] [address]. She will return to work/school on: 12/29/2021    If there are questions or concerns please have the patient contact our office.         Sincerely,      Anali Rebolledo MD

## 2021-12-24 LAB
25(OH)D3+25(OH)D2 SERPL-MCNC: 33.8 NG/ML (ref 30–100)
ALBUMIN SERPL-MCNC: 3.8 G/DL (ref 3.8–4.8)
ALBUMIN/GLOB SERPL: 1.4 {RATIO} (ref 1.2–2.2)
ALP SERPL-CCNC: 64 IU/L (ref 44–121)
ALT SERPL-CCNC: 8 IU/L (ref 0–32)
AST SERPL-CCNC: 16 IU/L (ref 0–40)
BASOPHILS # BLD AUTO: 0 X10E3/UL (ref 0–0.2)
BASOPHILS NFR BLD AUTO: 1 %
BILIRUB SERPL-MCNC: 0.3 MG/DL (ref 0–1.2)
BUN SERPL-MCNC: 7 MG/DL (ref 6–24)
BUN/CREAT SERPL: 9 (ref 9–23)
CALCIUM SERPL-MCNC: 9.1 MG/DL (ref 8.7–10.2)
CHLORIDE SERPL-SCNC: 102 MMOL/L (ref 96–106)
CHOLEST SERPL-MCNC: 223 MG/DL (ref 100–199)
CO2 SERPL-SCNC: 22 MMOL/L (ref 20–29)
CREAT SERPL-MCNC: 0.78 MG/DL (ref 0.57–1)
EOSINOPHIL # BLD AUTO: 0.2 X10E3/UL (ref 0–0.4)
EOSINOPHIL NFR BLD AUTO: 3 %
ERYTHROCYTE [DISTWIDTH] IN BLOOD BY AUTOMATED COUNT: 14.2 % (ref 11.7–15.4)
EST. AVERAGE GLUCOSE BLD GHB EST-MCNC: 154 MG/DL
GLOBULIN SER CALC-MCNC: 2.8 G/DL (ref 1.5–4.5)
GLUCOSE SERPL-MCNC: 121 MG/DL (ref 65–99)
HBA1C MFR BLD: 7 % (ref 4.8–5.6)
HCT VFR BLD AUTO: 38.9 % (ref 34–46.6)
HDLC SERPL-MCNC: 68 MG/DL
HGB BLD-MCNC: 12.4 G/DL (ref 11.1–15.9)
IMM GRANULOCYTES # BLD AUTO: 0 X10E3/UL (ref 0–0.1)
IMM GRANULOCYTES NFR BLD AUTO: 0 %
LDLC SERPL CALC-MCNC: 139 MG/DL (ref 0–99)
LYMPHOCYTES # BLD AUTO: 2.2 X10E3/UL (ref 0.7–3.1)
LYMPHOCYTES NFR BLD AUTO: 42 %
MCH RBC QN AUTO: 25.9 PG (ref 26.6–33)
MCHC RBC AUTO-ENTMCNC: 31.9 G/DL (ref 31.5–35.7)
MCV RBC AUTO: 81 FL (ref 79–97)
MONOCYTES # BLD AUTO: 0.5 X10E3/UL (ref 0.1–0.9)
MONOCYTES NFR BLD AUTO: 9 %
NEUTROPHILS # BLD AUTO: 2.4 X10E3/UL (ref 1.4–7)
NEUTROPHILS NFR BLD AUTO: 45 %
PLATELET # BLD AUTO: 273 X10E3/UL (ref 150–450)
POTASSIUM SERPL-SCNC: 4 MMOL/L (ref 3.5–5.2)
PROT SERPL-MCNC: 6.6 G/DL (ref 6–8.5)
RBC # BLD AUTO: 4.78 X10E6/UL (ref 3.77–5.28)
SODIUM SERPL-SCNC: 138 MMOL/L (ref 134–144)
TRIGL SERPL-MCNC: 92 MG/DL (ref 0–149)
TSH SERPL DL<=0.005 MIU/L-ACNC: 1.8 UIU/ML (ref 0.45–4.5)
VLDLC SERPL CALC-MCNC: 16 MG/DL (ref 5–40)
WBC # BLD AUTO: 5.2 X10E3/UL (ref 3.4–10.8)

## 2021-12-24 NOTE — PROGRESS NOTES
Please call Ms. Delvis Garcia that her hemoglobin is normal and she is not anemic. Continue iron vitamin B38 folic acid. Her random blood sugar is 121 with potassium normal and liver enzymes and kidney function normal.Her cholesterol is improved compared to 5 months ago but still upper side 139 bad cholesterol and total 223 and she should be continued on statin if she is taking current dose I can increase in diet low in fat and keep the food diary and eat the healthy diet with no fried food and cut back on starch and sugar becauseHemoglobin A1c increased from 6.5 to 7%. If she cannot take Metformin I will start her on glipizide 5 mg once a day in early morning 20 minutes before eating. Her vitamin D improved 33 from 18 so continue over-the-counter vitamin D3 2000 unit/day and calcium 600 mg/day and multivitamins thyroid function is normal.

## 2021-12-28 NOTE — PROGRESS NOTES
Pt informed, she is going to make changes to her diet before starting on Glipizide, she states that she has been eating an increase in sugars and starches.

## 2022-01-20 RX ORDER — METFORMIN HYDROCHLORIDE 500 MG/1
TABLET ORAL
Qty: 90 TABLET | Refills: 0 | Status: SHIPPED | OUTPATIENT
Start: 2022-01-20 | End: 2022-05-19 | Stop reason: SINTOL

## 2022-01-29 PROBLEM — J30.2 SEASONAL RHINITIS: Status: RESOLVED | Noted: 2020-08-20 | Resolved: 2022-01-29

## 2022-03-18 PROBLEM — S76.319A STRAIN OF HAMSTRING: Status: ACTIVE | Noted: 2020-08-20

## 2022-03-18 PROBLEM — M62.830 MUSCLE SPASM OF BACK: Status: ACTIVE | Noted: 2021-12-22

## 2022-03-18 PROBLEM — E55.9 VITAMIN D DEFICIENCY: Status: ACTIVE | Noted: 2020-08-20

## 2022-03-18 PROBLEM — Z83.3 FAMILY HISTORY OF DIABETES MELLITUS (DM): Status: ACTIVE | Noted: 2020-08-20

## 2022-03-18 PROBLEM — E11.9 CONTROLLED TYPE 2 DIABETES MELLITUS WITHOUT COMPLICATION, WITHOUT LONG-TERM CURRENT USE OF INSULIN (HCC): Status: ACTIVE | Noted: 2020-12-22

## 2022-03-18 PROBLEM — M72.2 PLANTAR FASCIITIS, BILATERAL: Status: ACTIVE | Noted: 2020-08-20

## 2022-03-18 PROBLEM — R00.2 PALPITATION: Status: ACTIVE | Noted: 2021-06-11

## 2022-03-18 PROBLEM — D64.9 ANEMIA, UNSPECIFIED TYPE: Status: ACTIVE | Noted: 2020-12-22

## 2022-03-18 PROBLEM — M54.2 NECK PAIN: Status: ACTIVE | Noted: 2020-12-22

## 2022-03-18 PROBLEM — Z82.49 FAMILY HISTORY OF CARDIOVASCULAR DISEASE: Status: ACTIVE | Noted: 2020-08-20

## 2022-03-19 PROBLEM — E78.00 PURE HYPERCHOLESTEROLEMIA: Status: ACTIVE | Noted: 2020-12-22

## 2022-03-19 PROBLEM — Z09 HOSPITAL DISCHARGE FOLLOW-UP: Status: ACTIVE | Noted: 2021-06-11

## 2022-03-19 PROBLEM — M25.562 LEFT KNEE PAIN, UNSPECIFIED CHRONICITY: Status: ACTIVE | Noted: 2021-06-11

## 2022-03-19 PROBLEM — N39.43 DRIBBLING: Status: ACTIVE | Noted: 2021-07-14

## 2022-03-19 PROBLEM — E66.01 MORBID OBESITY (HCC): Status: ACTIVE | Noted: 2020-08-20

## 2022-03-19 PROBLEM — K21.9 GASTROESOPHAGEAL REFLUX DISEASE WITHOUT ESOPHAGITIS: Status: ACTIVE | Noted: 2020-12-22

## 2022-03-19 PROBLEM — I10 ESSENTIAL HYPERTENSION: Status: ACTIVE | Noted: 2020-08-20

## 2022-03-20 PROBLEM — J30.9 ALLERGIC RHINITIS, UNSPECIFIED SEASONALITY, UNSPECIFIED TRIGGER: Status: ACTIVE | Noted: 2020-12-22

## 2022-03-20 PROBLEM — M79.605 PAIN OF LEFT LOWER EXTREMITY: Status: ACTIVE | Noted: 2021-06-11

## 2022-03-28 RX ORDER — LISINOPRIL AND HYDROCHLOROTHIAZIDE 12.5; 2 MG/1; MG/1
TABLET ORAL
Qty: 90 TABLET | Refills: 0 | Status: SHIPPED | OUTPATIENT
Start: 2022-03-28 | End: 2022-05-19 | Stop reason: SDUPTHER

## 2022-05-01 PROBLEM — M62.830 MUSCLE SPASM OF BACK: Status: RESOLVED | Noted: 2021-12-22 | Resolved: 2022-05-01

## 2022-05-01 PROBLEM — M79.605 PAIN OF LEFT LOWER EXTREMITY: Status: RESOLVED | Noted: 2021-06-11 | Resolved: 2022-05-01

## 2022-05-01 PROBLEM — Z09 HOSPITAL DISCHARGE FOLLOW-UP: Status: RESOLVED | Noted: 2021-06-11 | Resolved: 2022-05-01

## 2022-05-01 PROBLEM — S76.319A STRAIN OF HAMSTRING: Status: RESOLVED | Noted: 2020-08-20 | Resolved: 2022-05-01

## 2022-05-01 PROBLEM — N39.43 DRIBBLING: Status: RESOLVED | Noted: 2021-07-14 | Resolved: 2022-05-01

## 2022-05-01 PROBLEM — M54.2 NECK PAIN: Status: RESOLVED | Noted: 2020-12-22 | Resolved: 2022-05-01

## 2022-05-01 PROBLEM — Z11.59 NEED FOR HEPATITIS C SCREENING TEST: Status: ACTIVE | Noted: 2022-05-01

## 2022-05-01 PROBLEM — Z12.4 SCREENING FOR CERVICAL CANCER: Status: ACTIVE | Noted: 2022-05-01

## 2022-05-01 PROBLEM — Z12.11 SCREENING FOR COLON CANCER: Status: ACTIVE | Noted: 2022-05-01

## 2022-05-01 PROBLEM — Z82.49 FAMILY HISTORY OF CARDIOVASCULAR DISEASE: Status: RESOLVED | Noted: 2020-08-20 | Resolved: 2022-05-01

## 2022-05-01 PROBLEM — Z12.31 SCREENING MAMMOGRAM FOR BREAST CANCER: Status: ACTIVE | Noted: 2022-05-01
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
## 2022-05-19 ENCOUNTER — TELEPHONE (OUTPATIENT)
Dept: INTERNAL MEDICINE CLINIC | Age: 52
End: 2022-05-19

## 2022-05-19 ENCOUNTER — OFFICE VISIT (OUTPATIENT)
Dept: INTERNAL MEDICINE CLINIC | Age: 52
End: 2022-05-19
Payer: COMMERCIAL

## 2022-05-19 VITALS
HEIGHT: 62 IN | BODY MASS INDEX: 42.1 KG/M2 | RESPIRATION RATE: 20 BRPM | TEMPERATURE: 98.2 F | DIASTOLIC BLOOD PRESSURE: 88 MMHG | HEART RATE: 96 BPM | WEIGHT: 228.8 LBS | SYSTOLIC BLOOD PRESSURE: 130 MMHG | OXYGEN SATURATION: 98 %

## 2022-05-19 DIAGNOSIS — D64.9 ANEMIA, UNSPECIFIED TYPE: ICD-10-CM

## 2022-05-19 DIAGNOSIS — M25.562 LEFT KNEE PAIN, UNSPECIFIED CHRONICITY: ICD-10-CM

## 2022-05-19 DIAGNOSIS — E78.00 PURE HYPERCHOLESTEROLEMIA: ICD-10-CM

## 2022-05-19 DIAGNOSIS — F41.9 ANXIETY: ICD-10-CM

## 2022-05-19 DIAGNOSIS — E66.01 MORBID OBESITY (HCC): ICD-10-CM

## 2022-05-19 DIAGNOSIS — R93.89 ABNORMAL ULTRASOUND OF PELVIS: ICD-10-CM

## 2022-05-19 DIAGNOSIS — Z12.31 SCREENING MAMMOGRAM FOR BREAST CANCER: ICD-10-CM

## 2022-05-19 DIAGNOSIS — Z12.11 SCREENING FOR COLON CANCER: ICD-10-CM

## 2022-05-19 DIAGNOSIS — M72.2 PLANTAR FASCIITIS, BILATERAL: ICD-10-CM

## 2022-05-19 DIAGNOSIS — I10 ESSENTIAL HYPERTENSION: ICD-10-CM

## 2022-05-19 DIAGNOSIS — Z11.59 NEED FOR HEPATITIS C SCREENING TEST: ICD-10-CM

## 2022-05-19 DIAGNOSIS — Z12.4 SCREENING FOR CERVICAL CANCER: ICD-10-CM

## 2022-05-19 DIAGNOSIS — E11.65 TYPE 2 DIABETES MELLITUS WITH HYPERGLYCEMIA, WITHOUT LONG-TERM CURRENT USE OF INSULIN (HCC): ICD-10-CM

## 2022-05-19 DIAGNOSIS — K21.9 GASTROESOPHAGEAL REFLUX DISEASE WITHOUT ESOPHAGITIS: ICD-10-CM

## 2022-05-19 PROBLEM — Z90.710 S/P HYSTERECTOMY: Status: ACTIVE | Noted: 2022-05-19

## 2022-05-19 LAB
GLUCOSE POC: 177 MG/DL
HBA1C MFR BLD HPLC: 7.2 %

## 2022-05-19 PROCEDURE — 83036 HEMOGLOBIN GLYCOSYLATED A1C: CPT | Performed by: INTERNAL MEDICINE

## 2022-05-19 PROCEDURE — 82962 GLUCOSE BLOOD TEST: CPT | Performed by: INTERNAL MEDICINE

## 2022-05-19 PROCEDURE — 99214 OFFICE O/P EST MOD 30 MIN: CPT | Performed by: INTERNAL MEDICINE

## 2022-05-19 RX ORDER — DULAGLUTIDE 0.75 MG/.5ML
0.75 INJECTION, SOLUTION SUBCUTANEOUS
Qty: 4 PEN | Refills: 1 | Status: SHIPPED | OUTPATIENT
Start: 2022-05-19 | End: 2022-07-19

## 2022-05-19 RX ORDER — ISOPROPYL ALCOHOL 70 ML/100ML
1 SWAB TOPICAL ONCE
Qty: 50 PAD | Refills: 4 | Status: SHIPPED | OUTPATIENT
Start: 2022-05-19 | End: 2022-05-19

## 2022-05-19 RX ORDER — ESCITALOPRAM OXALATE 20 MG/1
20 TABLET ORAL DAILY
Qty: 90 TABLET | Refills: 1 | Status: SHIPPED | OUTPATIENT
Start: 2022-05-19

## 2022-05-19 RX ORDER — IBUPROFEN 200 MG
CAPSULE ORAL
Qty: 100 STRIP | Refills: 1 | Status: SHIPPED | OUTPATIENT
Start: 2022-05-19

## 2022-05-19 RX ORDER — LANCETS
EACH MISCELLANEOUS
Qty: 100 EACH | Refills: 1 | Status: SHIPPED | OUTPATIENT
Start: 2022-05-19

## 2022-05-19 RX ORDER — ATORVASTATIN CALCIUM 10 MG/1
10 TABLET, FILM COATED ORAL
Qty: 90 TABLET | Refills: 1 | Status: SHIPPED | OUTPATIENT
Start: 2022-05-19

## 2022-05-19 RX ORDER — LISINOPRIL AND HYDROCHLOROTHIAZIDE 12.5; 2 MG/1; MG/1
1 TABLET ORAL DAILY
Qty: 90 TABLET | Refills: 1 | Status: SHIPPED | OUTPATIENT
Start: 2022-05-19

## 2022-05-19 RX ORDER — GLIPIZIDE 5 MG/1
TABLET ORAL
Qty: 90 TABLET | Refills: 0 | Status: SHIPPED | OUTPATIENT
Start: 2022-05-19

## 2022-05-19 RX ORDER — INSULIN PUMP SYRINGE, 3 ML
EACH MISCELLANEOUS
Qty: 1 KIT | Refills: 1 | Status: SHIPPED | OUTPATIENT
Start: 2022-05-19

## 2022-05-19 NOTE — PROGRESS NOTES
Dheeraj Obrien is a 49 y.o. female and presents with Follow Up Chronic Condition, Hypertension, and Diabetes (bs 177)                      Review of Systems    ROS     Past Medical History:   Diagnosis Date    Anemia     CHF (congestive heart failure) ([facility] [address] 8/20/2020    Controlled type 2 diabetes mellitus with complication, without long-term current use of insulin ([facility] [address] 12/22/2020    Depression     Essential hypertension 8/20/2020    Family history of cardiovascular disease 8/20/2020    Family history of diabetes mellitus (DM) 8/20/2020    GERD (gastroesophageal reflux disease)     Plantar fasciitis, bilateral 8/20/2020    Pure hypercholesterolemia 12/22/2020    Seasonal rhinitis 8/20/2020    Strain of hamstring 8/20/2020    Vitamin D deficiency 8/20/2020     Past Surgical History:   Procedure Laterality Date    HX GYN      HX TUBAL LIGATION  1999    UT ABDOMEN SURGERY PROC UNLISTED       Social History     Socioeconomic History    Marital status: SINGLE   Tobacco Use    Smoking status: Never Smoker    Smokeless tobacco: Never Used   Substance and Sexual Activity    Alcohol use: Not Currently    Drug use: Never     Social Determinants of Health     Financial Resource Strain: Low Risk     Difficulty of Paying Living Expenses: Not hard at all   Food Insecurity: No Food Insecurity    Worried About Running Out of Food in the Last Year: Never true    Suzie of Food in the Last Year: Never true     Family History   Problem Relation Age of Onset    Hypertension Mother     Diabetes Mother     Hypertension Father     Diabetes Father     Diabetes Sister     Hypertension Sister     Diabetes Brother     Hypertension Brother      Current Outpatient Medications   Medication Sig Dispense Refill    Blood-Glucose Meter monitoring kit Check sugar once a day 1 Kit 1    glipiZIDE (GLUCOTROL) 5 mg tablet One pill 15 minutes before breakfast once a day 90 Tablet 0    glucose blood VI test strips (blood glucose test) strip Check sugar once a day 100 Strip 1    lancets misc Check sugar once a day 100 Each 1    alcohol swabs padm 1 Box by Apply Externally route once for 1 dose. 50 Pad 4    dulaglutide (Trulicity) 5.58 VS/1.1 mL sub-q pen 0.5 mL by SubCUTAneous route every seven (7) days. 4 Pen 1    lisinopril-hydroCHLOROthiazide (PRINZIDE, ZESTORETIC) 20-12.5 mg per tablet Take 1 Tablet by mouth daily. in the morning 90 Tablet 1    atorvastatin (LIPITOR) 10 mg tablet Take 1 Tablet by mouth nightly. 90 Tablet 1    escitalopram oxalate (LEXAPRO) 20 mg tablet Take 1 Tablet by mouth daily. 90 Tablet 1    cyclobenzaprine (FLEXERIL) 10 mg tablet Take 1 Tablet by mouth nightly as needed for Muscle Spasm(s). 30 Tablet 0    meloxicam (MOBIC) 15 mg tablet Take 1 Tablet by mouth daily. Take for 1 week for 1 week then as needed with food 30 Tablet 0    azelastine (ASTELIN) 137 mcg (0.1 %) nasal spray 1 Rindge by Both Nostrils route two (2) times a day. Start once daily before bed, increase to BID if helpful 1 Each 1    omeprazole (PRILOSEC) 40 mg capsule Take 1 Capsule by mouth daily. Take  1 capsule ,30 minutes before eating once a day 90 Capsule 0    fluticasone propionate (FLONASE) 50 mcg/actuation nasal spray 2 Sprays by Both Nostrils route daily. 1 Bottle 3    loratadine (CLARITIN) 10 mg tablet Take 1 Tab by mouth daily. 90 Tab 1     Allergies   Allergen Reactions    Shellfish Derived Anaphylaxis       Objective:  Visit Vitals  /88 (BP 1 Location: Left upper arm)   Pulse 96   Temp 98.2 °F (36.8 °C) (Temporal)   Resp 20   Ht 5' 2\" (1.575 m)   Wt 228 lb 12.8 oz (103.8 kg)   SpO2 98%   BMI 41.85 kg/m²       Physical Exam:   Constitutional: General Appearance: . Level of Distress: NAD. Psychiatric: Mental Status: normal mood and affect Orientation: to time, place, and person. ,normal eye contact. Head: Head: normocephalic and atraumatic. Eyes: Pupils: PERRLA. Sclerae: non-icteric.    Neck: Neck: supple, trachea midline, and no masses. Lymph Nodes: no cervical LAD. Thyroid: no enlargement or nodules and non-tender. Lungs: Respiratory effort: no dyspnea. Auscultation: no wheezing, rales/crackles, or rhonchi and breath sounds normal and good air movement. Cardiovascular: Apical Impulse: not displaced. Heart Auscultation: normal S1 and S2; no murmurs, rubs, or gallops; and RRR. Neck vessels: no carotid bruits. Pulses including femoral / pedal: normal throughout. Abdomen: Bowel Sounds: normal. Inspection and Palpation: no tenderness, guarding, or masses and soft and non-distended. Liver: non-tender and no hepatomegaly. Spleen: non-tender and no splenomegaly. Musculoskeletal[de-identified] Extremities: no edema,no varicosities. No Calf tenderness. Neurologic: Gait and Station: normal gait and station. Motor Strength normal right and left. Skin: Inspection and palpation: no rash, lesions, or ulcer. Results for orders placed or performed in visit on 05/19/22   AMB POC GLUCOSE BLOOD, BY GLUCOSE MONITORING DEVICE   Result Value Ref Range    Glucose  MG/DL   AMB POC HEMOGLOBIN A1C   Result Value Ref Range    Hemoglobin A1c (POC) 7.2 %       Assessment/Plan:      ICD-10-CM ICD-9-CM    1. Essential hypertension  I10 401.9    2. Type 2 diabetes mellitus with hyperglycemia, without long-term current use of insulin (HCC)  E11.65 250.00 AMB POC GLUCOSE BLOOD, BY GLUCOSE MONITORING DEVICE     790.29 AMB POC HEMOGLOBIN A1C      REFERRAL TO DIABETIC EDUCATION      CBC WITH AUTOMATED DIFF      METABOLIC PANEL, COMPREHENSIVE      MICROALBUMIN, UR, RAND W/ MICROALB/CREAT RATIO       DIABETES FOOT EXAM   3. Pure hypercholesterolemia  E78.00 272.0 LIPID PANEL   4. Anxiety  F41.9 300.00    5. Gastroesophageal reflux disease without esophagitis  K21.9 530.81    6. Morbid obesity ([facility] [address]  E66.01 278.01    7. Anemia, unspecified type  D64.9 285.9    8. Plantar fasciitis, bilateral  M72.2 728.71    9.  Left knee pain, unspecified chronicity  M25.562 719.46    10. Need for hepatitis C screening test  Z11.59 V73.89 HEPATITIS C AB   11. Screening mammogram for breast cancer  Z12.31 V76.12    12. Screening for cervical cancer  Z12.4 V76.2 REFERRAL TO GYNECOLOGY   13. Screening for colon cancer  Z12.11 V76.51 REFERRAL TO GASTROENTEROLOGY   14. Abnormal ultrasound of pelvis  R93.89 793.5 REFERRAL TO GYNECOLOGY     Orders Placed This Encounter    HEPATITIS C AB    CBC WITH AUTOMATED DIFF    METABOLIC PANEL, COMPREHENSIVE    LIPID PANEL    MICROALBUMIN, UR, RAND W/ MICROALB/CREAT RATIO    REFERRAL TO DIABETIC EDUCATION     Referral Priority:   Routine     Referral Type:   Consultation     Referral Reason:   Specialty Services Required     Number of Visits Requested:   1    REFERRAL TO GYNECOLOGY     Referral Priority:   Routine     Referral Type:   Consultation     Referral Reason:   Specialty Services Required     Referred to Provider:   Samuel Schafer MD     Number of Visits Requested:   1    REFERRAL TO GASTROENTEROLOGY     Referral Priority:   Routine     Referral Type:   Consultation     Referral Reason:   Specialty Services Required     Referred to Provider:   Shannon Santiago MD     Requested Specialty:   Gastroenterology     Number of Visits Requested:   1    AMB POC GLUCOSE BLOOD, BY GLUCOSE MONITORING DEVICE    AMB POC HEMOGLOBIN A1C    HM DIABETES FOOT EXAM    Blood-Glucose Meter monitoring kit     Sig: Check sugar once a day     Dispense:  1 Kit     Refill:  1    glipiZIDE (GLUCOTROL) 5 mg tablet     Sig: One pill 15 minutes before breakfast once a day     Dispense:  90 Tablet     Refill:  0    glucose blood VI test strips (blood glucose test) strip     Sig: Check sugar once a day     Dispense:  100 Strip     Refill:  1    lancets misc     Sig: Check sugar once a day     Dispense:  100 Each     Refill:  1    alcohol swabs padm     Si Box by Apply Externally route once for 1 dose.      Dispense:  50 Pad Refill:  4    dulaglutide (Trulicity) 5.33 XS/7.1 mL sub-q pen     Si.5 mL by SubCUTAneous route every seven (7) days. Dispense:  4 Pen     Refill:  1    lisinopril-hydroCHLOROthiazide (PRINZIDE, ZESTORETIC) 20-12.5 mg per tablet     Sig: Take 1 Tablet by mouth daily. in the morning     Dispense:  90 Tablet     Refill:  1    atorvastatin (LIPITOR) 10 mg tablet     Sig: Take 1 Tablet by mouth nightly. Dispense:  90 Tablet     Refill:  1    escitalopram oxalate (LEXAPRO) 20 mg tablet     Sig: Take 1 Tablet by mouth daily. Dispense:  90 Tablet     Refill:  1       Type 2 diabetes mellitus uncontrolled. Hemoglobin A1c 7.2% in the office. She could not tolerate metformin causing GI upset. I had a long discussion regarding various medications including use of Januvia, glipizide, SGLT2 1 receptor and GLP-1 receptor and, agonist I explained that it is challenging because of some of the medicine will not be covered by insurance and glipizide might cause weight gain so started with injection Trulicity which can help in weight loss begin with 0.6 mg subcu weekly she has no history of thyroid cancer in the family or medullary thyroid carcinoma in her family. She told me her friend told her to have phentermine for weight loss I explained that I cannot give phentermine because having history of hypertension and anxiety and Trulicity will help in weight loss along with control of blood sugar started on glipizide 5 mg once a day 20 minutes before eating and bring the blood sugar log diabetic supply provided she should start checking blood sugar once a day she cannot be qualified to have 3 glucose continuous glucose monitoring system explain her. Referred her to diabetic .    Explained about fasting blood sugar goal between  and random blood sugar goal less than 180. Diet low in starch sugar.   She has to be disciplined about compliance with diabetic diet otherwise it is difficult to control her sugar and weight she is eating all kind of food. Recommended to start aspirin 81 mg/day having no history of GI bleeding recommended to continue atorvastatin 10 mg at bedtime. Hypertension controlled continued on lisinopril hydrochlorothiazide 20/12.5 mg/day diet low in sodium. Anxiety takes escitalopram no negative thoughts. Hypercholesteremia/mixed hyperlipidemia continued on atorvastatin 10 mg at bedtime. Allergic rhinitis taking azelastine nasal spray and fluticasone nasal spray and she did not bring any medicines for reconciliation. Not a good historian. History of back pain history of sciatica visited [facility] last month and she had some cyst in the pelvis that she is going to discuss with her gynecologist Dr. Yessy Woods. Referral given. She takes cyclobenzaprine as needed. She also takes meloxicam as needed explained that she should avoid long-term use of NSAIDs which can affect target organs including stomach, kidney, heart, liver, blood pressure. GERD taking omeprazole. Health maintenance, preventive health checkup refer her to gynecologist for mammogram and Pap smear. Refer her to gastroenterologist for screening colonoscopy. Refer her to ophthalmologist for eye checkup. Follow-up in 2 months if possible with blood sugar log. Refill sent to the pharmacy. Answered all her questions. Reviewed the emergency room notes from [facility] reviewed her CT scan results given her copy. Explained and diabetic education given. Referral given to diabetic education classes. Diabetic monofilament test is negative in both feet. Dorsalis pedis palpable in both feet. lose weight, increase physical activity, follow low fat diet, follow low salt diet, continue present plan, routine labs ordered, call if any problems    There are no Patient Instructions on file for this visit.    Follow-up and Dispositions    · Return in about 2 months (around 7/19/2022) for diabetes, hypertension,cholesterol follow up.
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
## 2022-05-19 NOTE — TELEPHONE ENCOUNTER
[facility] faxed Need for Clarification    Note to prescriber:  Ms. Kristine Aggarwal is interested in the freestyle dung system can you send in new txs for that system?

## 2022-05-25 RX ORDER — FLASH GLUCOSE SENSOR
1 KIT MISCELLANEOUS
Qty: 2 KIT | Refills: 1 | Status: SHIPPED | OUTPATIENT
Start: 2022-05-25

## 2022-05-26 RX ORDER — FLASH GLUCOSE SCANNING READER
1 EACH MISCELLANEOUS 4 TIMES DAILY
Qty: 1 EACH | Refills: 0 | Status: SHIPPED | OUTPATIENT
Start: 2022-05-26

## 2022-05-26 RX ORDER — FLASH GLUCOSE SENSOR
1 KIT MISCELLANEOUS 4 TIMES DAILY
Qty: 1 KIT | Refills: 0 | Status: SHIPPED | OUTPATIENT
Start: 2022-05-26

## 2022-05-31 PROBLEM — Z12.11 SCREENING FOR COLON CANCER: Status: RESOLVED | Noted: 2022-05-01 | Resolved: 2022-05-31

## 2022-05-31 PROBLEM — Z11.59 NEED FOR HEPATITIS C SCREENING TEST: Status: RESOLVED | Noted: 2022-05-01 | Resolved: 2022-05-31

## 2022-05-31 PROBLEM — Z12.4 SCREENING FOR CERVICAL CANCER: Status: RESOLVED | Noted: 2022-05-01 | Resolved: 2022-05-31

## 2022-06-20 ENCOUNTER — OFFICE VISIT (OUTPATIENT)
Dept: ENT CLINIC | Age: 52
End: 2022-06-20
Payer: COMMERCIAL

## 2022-06-20 VITALS
OXYGEN SATURATION: 97 % | HEART RATE: 83 BPM | HEIGHT: 62 IN | WEIGHT: 229 LBS | DIASTOLIC BLOOD PRESSURE: 60 MMHG | RESPIRATION RATE: 20 BRPM | SYSTOLIC BLOOD PRESSURE: 132 MMHG | BODY MASS INDEX: 42.14 KG/M2

## 2022-06-20 DIAGNOSIS — Z91.09 ENVIRONMENTAL ALLERGIES: Primary | ICD-10-CM

## 2022-06-20 DIAGNOSIS — Z91.018 MULTIPLE FOOD ALLERGIES: ICD-10-CM

## 2022-06-20 PROCEDURE — 99213 OFFICE O/P EST LOW 20 MIN: CPT | Performed by: OTOLARYNGOLOGY

## 2022-06-20 RX ORDER — FLUTICASONE PROPIONATE 50 MCG
2 SPRAY, SUSPENSION (ML) NASAL DAILY
Qty: 1 EACH | Refills: 2 | Status: SHIPPED | OUTPATIENT
Start: 2022-06-20

## 2022-06-20 RX ORDER — LORATADINE 10 MG/1
10 TABLET ORAL DAILY
Qty: 90 TABLET | Refills: 1 | Status: SHIPPED | OUTPATIENT
Start: 2022-06-20

## 2022-06-20 RX ORDER — AZELASTINE 1 MG/ML
1 SPRAY, METERED NASAL 2 TIMES DAILY
Qty: 1 EACH | Refills: 1 | Status: SHIPPED | OUTPATIENT
Start: 2022-06-20

## 2022-06-20 NOTE — PROGRESS NOTES
Otolaryngology-Head and Neck Surgery  Follow Up Patient Visit     Patient: Jai Nolasco  YOB: 1970  MRN: [id number]  Date of Service:  6/20/2022    Chief Complaint: Follow up allergies     History of Present Illness: Jai Nolasco is a 49y.o. year old female who was last seen by [facility] NP for allergies    She was referred to Dr James Carolina due to concerns for both food and env allergies   Has since been getting allergy shots for the last few months     Allergies controlled with flonase, claritin and astelin   No sinus infections since LOV 6 mo ago       Past Medical History:  Past Medical History:   Diagnosis Date    Anemia     CHF (congestive heart failure) ([facility] [address] 8/20/2020    Controlled type 2 diabetes mellitus with complication, without long-term current use of insulin ([facility] [address] 12/22/2020    Depression     Essential hypertension 8/20/2020    Family history of cardiovascular disease 8/20/2020    Family history of diabetes mellitus (DM) 8/20/2020    GERD (gastroesophageal reflux disease)     Plantar fasciitis, bilateral 8/20/2020    Pure hypercholesterolemia 12/22/2020    Seasonal rhinitis 8/20/2020    Strain of hamstring 8/20/2020    Vitamin D deficiency 8/20/2020       Past Surgical History:   Past Surgical History:   Procedure Laterality Date    HX GYN      HX TUBAL LIGATION  1999    SD ABDOMEN SURGERY PROC UNLISTED         Medications:   Current Outpatient Medications   Medication Instructions    atorvastatin (LIPITOR) 10 mg, Oral, EVERY BEDTIME    azelastine (ASTELIN) 137 mcg (0.1 %) nasal spray 1 Spray, Both Nostrils, 2 TIMES DAILY, Start once daily before bed, increase to BID if helpful    Blood-Glucose Meter monitoring kit Check sugar once a day    cyclobenzaprine (FLEXERIL) 10 mg, Oral, BEDTIME PRN    escitalopram oxalate (LEXAPRO) 20 mg, Oral, DAILY    flash glucose scanning reader (FreeStyle Elliott 2 [place]) misc 1 Each, Does Not Apply, 4 TIMES DAILY    flash glucose sensor (FreeStyle Elliott 14 Day Sensor) kit 1 Kit, Does Not Apply, EVERY 14 DAYS, Use to check blood sugars qid. Dx E11.9    flash glucose sensor (FreeStyle Elliott 2 Sensor) kit 1 Each, Does Not Apply, 4 TIMES DAILY    fluticasone propionate (FLONASE) 50 mcg/actuation nasal spray 2 Sprays, Both Nostrils, DAILY    glipiZIDE (GLUCOTROL) 5 mg tablet One pill 15 minutes before breakfast once a day    glucose blood VI test strips (blood glucose test) strip Check sugar once a day    lancets misc Check sugar once a day    lisinopril-hydroCHLOROthiazide (PRINZIDE, ZESTORETIC) 20-12.5 mg per tablet 1 Tablet, Oral, DAILY, in the morning    loratadine (CLARITIN) 10 mg, Oral, DAILY    meloxicam (MOBIC) 15 mg, Oral, DAILY, Take for 1 week for 1 week then as needed with food    omeprazole (PRILOSEC) 40 mg, Oral, DAILY, Take  1 capsule ,30 minutes before eating once a day    Trulicity 7.93 mg, SubCUTAneous, EVERY 7 DAYS       Allergies: Allergies   Allergen Reactions    Shellfish Derived Anaphylaxis       Social History:   Social History     Tobacco Use    Smoking status: Never Smoker    Smokeless tobacco: Never Used   Substance Use Topics    Alcohol use: Not Currently    Drug use: Never       Family History:  Family History   Problem Relation Age of Onset    Hypertension Mother     Diabetes Mother     Hypertension Father     Diabetes Father     Diabetes Sister     Hypertension Sister     Diabetes Brother     Hypertension Brother        Review of Systems:  Consitutional: denies fever, excessive weight gain or loss. Eyes: denies diplopia, eye pain. Integumentary: denies new concerning skin lesions. Ears, Nose, Mouth, Throat: denies except as per HPI.   Endocrine: denies hot or cold intolerance, increased thirst.  Respiratory: denies cough, hemoptysis, wheezing  Gastrointestinal: denies trouble swallowing, nausea, emesis, regurgitation  Musculoskeletal: denies muscle weakness or wasting  Cardiovascular: denies chest pain, shortness of breath  Neurologic: denies seizures, numbness or tingling, syncope  Hematologic: denies easy bleeding or bruising    Physical Examination:   Vitals:    06/20/22 1113   BP: 132/60   Pulse: 83   Resp: 20   Height: 5' 2\" (1.575 m)   Weight: 229 lb (103.9 kg)   SpO2: 97%         General: Comfortable, pleasant, appears stated age  Voice: Strong, speaking in full sentences, no stridor    Face: No masses or lesions, facial strength symmetric   Ears: External ears unremarkable. Bilateral ear canal clear. Tympanic membrane clear and intact, with visible landmarks. Clear middle ear space  Nose: External nose unremarkable. Dorsum midline. Anterior rhinoscopy demonstrates no lesions. Septum midline. Turbinates without hypertrophy. Oral Cavity / Oropharynx: No trismus. Mucosa pink and moist. No lesions. Tongue is midline and mobile. Palate elevates symmetrically. Uvula midline. Tonsils unremarkable. Base of tongue soft. Floor of mouth soft. Neck: Supple. No adenopathy. Thyroid unremarkable. Palpable laryngeal landmarks. Full neck range of motion   Neurologic: CN II - XI intact. Normal gait      Assessment and Plan:   1. Allergic rhinitis  2. Food allergies  - Just started IT with Dr Bill Billingsley   - Doing well otherwise on regimen of flonase, astelin and claritin  - Will send in refills  - Discussed cont follow up with Dr Bill Billingsley for allergy management  - If develops recurrent/persistent sinus infections despite above, to let us know        The patient was instructed to return to clinic if no improvement or progression of symptoms. Signs to watch out for reviewed.       MD Evelin Castaneda [facility]  [address] [place] [address] 6  [facility] [place]  Office Phone: [phone]

## 2022-06-20 NOTE — LETTER
6/20/2022    Patient: Tia Maria   YOB: 1970   Date of Visit: 6/20/2022     Evelyn Ferguson MD  [address] [place] [address]  [facility] [facility] [address]    Dear Evelyn Ferguson MD,      Thank you for referring Ms. Tia Maria to [facility] [address] for evaluation. My notes for this consultation are attached. If you have questions, please do not hesitate to call me. I look forward to following your patient along with you.       Sincerely,    Carlitos Sanchez MD

## 2022-06-20 NOTE — PROGRESS NOTES
Chief Complaint   Patient presents with    Follow-up     sinus       Visit Vitals  /60   Pulse 83   Resp 20   Ht 5' 2\" (1.575 m)   Wt 229 lb (103.9 kg)   SpO2 97%   BMI 41.88 kg/m²

## 2022-06-24 ENCOUNTER — CLINICAL SUPPORT (OUTPATIENT)
Dept: DIABETES SERVICES | Age: 52
End: 2022-06-24
Payer: COMMERCIAL

## 2022-06-24 DIAGNOSIS — E11.9 CONTROLLED TYPE 2 DIABETES MELLITUS WITHOUT COMPLICATION, WITHOUT LONG-TERM CURRENT USE OF INSULIN (HCC): Primary | ICD-10-CM

## 2022-06-24 PROCEDURE — G0108 DIAB MANAGE TRN  PER INDIV: HCPCS

## 2022-06-24 NOTE — PROGRESS NOTES
[employer] [facility]  Diabetes Self-Management Education & Support Program    Reason for Referral: DSMES  Referral Source: Carolina Shelby MD  Services requested: DSMES       ASSESSMENT    From my perspective, the participant would benefit from [facility] specifically related to reducing risks, healthy eating, monitoring, taking medications, physical activity, healthy coping and problem solving. Will adapt DSMES program to build on participant's skills score, confidence score and preparedness score as noted in the Diabetes Skills, Confidence, and Preparedness Index. During the program, we will focus on providing DSMES that specifically addresses participant's interest in healthy eating, monitoring, physical activity and healthy coping, as shown by their reported readiness to change. The participant would be best served by attending weekly group class series. Diabetes Self-Management Education Follow-up Visit: August 5, 2022     Clinical Presentation  Eugene Garibay is a 49 y.o.  female referred for diabetes self-management education. Participant has Type 2 DM not on insulin for <1 year. Family history positivefor diabetes. Patient reports not receiving DSMES services in the past. Most recent A1c value:   Lab Results   Component Value Date/Time    Hemoglobin A1c 7.0 (H) 12/23/2021 08:22 AM    Hemoglobin A1c (POC) 7.2 05/19/2022 02:06 PM         Diabetes-related medications:  Current dosing:   Key Antihyperglycemic Medications             glipiZIDE (GLUCOTROL) 5 mg tablet One pill 15 minutes before breakfast once a day    dulaglutide (Trulicity) 1.71 OL/4.5 mL sub-q pen 0.5 mL by SubCUTAneous route every seven (7) days. Blood Pressure Management  Key ACE/ARB Medications             lisinopril-hydroCHLOROthiazide (PRINZIDE, ZESTORETIC) 20-12.5 mg per tablet Take 1 Tablet by mouth daily.  in the morning          Lipid Management  Key Antihyperlipidemia Meds atorvastatin (LIPITOR) 10 mg tablet Take 1 Tablet by mouth nightly. Clot Prevention  Key Anti-Platelet Anticoagulant Meds     The patient is on no antiplatelet meds or anticoagulants. Learning Assessment  Learning objectives Educator assessment (6/24/2022)   Diabetes Disease Process  The participant can   A) describe diabetes in basic terms;   B) state the type of diabetes they have; &   C) state accepted blood glucose targets. Healthy Eating  The participant can   A) identify carbohydrate foods; &   B) accurately read food labels. Being Active  The participant can  A) state the benefits of physical activity;  B) report their current PA practices;  C) identify PA they would consider incorporating in their lives; &  D) develop an implementation plan. Monitoring  The participant can  A) operate their blood glucose meter; &  B) describe how they log their blood glucoses to share with their provider. Taking Medications  The participant can  A) name their diabetes medications;  B) state the purpose and dose;  C) note side effects; &  D) describe proper storage, disposal & transport (if appropriate). Healthy Coping  The participant can    A) describe their response to diabetes diagnosis; B) describe their specific coping mechanisms;  C) identify supportive people and/or other resources that positively support their diabetes self-care and health. Reducing Risks  The participant can describe the preventive measures used by providers to promote health and prevent diabetes complications. Problem Solving  The participant can   A) identify signs, symptoms & treatment of hypoglycemia;    B) identify signs, symptoms & treatment of hyperglycemia;  C) describe their sick day plan; &  D) identify BG patterns to discuss with their provider.        No  No  No        Yes  No        Yes  No  Yes  Yes        No  No        No  No  Yes  Yes        Yes  Yes  Yes        Yes          No  No  No  No Characteristics to Learning   Barriers to Learning      None     Favorite Ways to Learn   [] Lecture  [] Slides  [] Reading [] Video-Internet  [] Cassettes/CDs/MP3's  [] Interactive Small Groups [] Other       Behavioral Assessment  Current self-care practices  Educator assessment (6/24/2022)   Healthy Eating   Current practices    24-hour Dietary Recall:  Breakfast: None  Lunch: Soup  Dinner: Brocoli, rice, gravy  Snacks: sherbert,   Beverages: Dt. Mt. Dew, water  Alcohol: None       Would benefit from [facility] related to Healthy Eating: Yes    Eats a carbohydrate controlled diet: No    Stage of change: Preparation      Being Active  Current practices  How many days during the past week have you performed physical activity where your heart beats faster and your breathing is harder than normal for 30 minutes or more?  0 day(s)    How many days in a typical week do you perform activity such as this?  0 day(s)     Would benefit from [facility] related to Being Active: Yes}      Exercises 150 minutes/week: No      Stage of change: Preparation     Monitoring  Current practices  Do you monitor your blood sugar? No    How often do you monitor? never    Do you know your last A1c measurement? No    Do you know the meaning of the A1c? No     Would benefit from [facility] related to Monitoring: Yes      Uses BG readings to establish trends and understand BG patterns: No      Stage of change: Preparation       Taking Medication  Current practices  Do you understand what your diabetes medications do? Yes    How often do you miss doses of your diabetes medications? never    Can you afford your diabetes medications? Yes   Would benefit from [facility] related to Taking Medication: Yes      Takes medications consistently to receive full benefit: Yes      Stage of change: Preparation       Healthy Coping   Current state  Diabetes Skills, Confidence and Preparedness Index:   Total score: 4.8  Skills: 4.0  Confidence: 5.7  Preparedness: 4.9 Would benefit from [facility] related to Healthy Coping: Yes      Identifies specific people, organizations,etc, that actively support their diabetes self-care efforts: Yes      Stage of change: Action     Reducing Risks  Current state  Vaccines:  Influenza:   Immunization History   Administered Date(s) Administered    Influenza Vaccine 10/27/2009       Pneumococcal: There is no immunization history for the selected administration types on file for this patient. Hepatitis:   Immunization History   Administered Date(s) Administered    Hep B Vaccine 09/23/1998, 10/28/1998       Examinations:  Diabetic Foot and Eye Exam HM Status   Topic Date Due    Eye Exam  Never done    Diabetic Foot Care  05/19/2023        Dental exam: due    Foot exam: due    Heart Protection:  BP Readings from Last 2 Encounters:   06/20/22 132/60   05/19/22 130/88        Lab Results   Component Value Date/Time    LDL, calculated 139 (H) 12/23/2021 08:22 AM    LDL, calculated 133 (H) 08/21/2020 02:19 PM        Kidney Protection:  No results found for: [facility], [address], [address] [place] [address] 88, [facility], [address] [place], [place] [address]     Would benefit from [facility] related to Reducing Risks:  Yes      Actively participates in decision-making with provider regarding secondary prevention:  No      Stage of change: Preparation   Problem Solving  Current state  Hypoglycemia Management:  What are signs and symptoms of hypoglycemia that you experience: Pt reported being unaware of s/s of hypoglycemia    How do you prevent hypoglycemia: patient is unaware of how to treat low blood sugars    How do you treat hypoglycemia: Patient is unaware of how to treat hypoglycemia    Hyperglycemia Management:  What are signs and symptoms of hyperglycemia that you experience: Pt reported being unaware of s/s of hyperglycemia    How can you prevent hyperglycemia: patient is unaware of how to prevent high blood sugars    Sick Day Management:  What do you do differently on sick days:  Pt reported being unaware of self-management on sick days    Pattern Management:  Do you notice blood glucose patterns when you look at the readings in your meter or logbook? No    How do you use the blood glucose readings from your meter or logbook? Not monitoring     Would benefit from [facility] related to Problem Solving: Yes      Articulates appropriate strategies to address hypoglycemia, hyperglycemia, sick day care and BG pattern: No      Stage of change: Preparation       Note: Content derived from the American Association of Diabetes Educators' Diabetes Education Curriculum: A Guide to Successful Self-Management (3rd edition)      Hilda Rodriguez RN on 6/24/2022 at 1:21 PM    I have personally reviewed the health record, including provider notes, laboratory data and current medications before making these care and education recommendations. The time spent in this effort is included in the total time.   Total minutes: 30

## 2022-07-19 RX ORDER — DULAGLUTIDE 1.5 MG/.5ML
1.5 INJECTION, SOLUTION SUBCUTANEOUS
Qty: 4 EACH | Refills: 2 | Status: SHIPPED | OUTPATIENT
Start: 2022-07-19

## 2022-07-20 NOTE — TELEPHONE ENCOUNTER
Detail Level: Detailed Prescription sent to the pharmacy Quality 130: Documentation Of Current Medications In The Medical Record: Current Medications Documented

## 2022-08-05 ENCOUNTER — CLINICAL SUPPORT (OUTPATIENT)
Dept: DIABETES SERVICES | Age: 52
End: 2022-08-05
Payer: COMMERCIAL

## 2022-08-05 DIAGNOSIS — E11.9 CONTROLLED TYPE 2 DIABETES MELLITUS WITHOUT COMPLICATION, WITHOUT LONG-TERM CURRENT USE OF INSULIN (HCC): Primary | ICD-10-CM

## 2022-08-05 PROCEDURE — G0109 DIAB MANAGE TRN IND/GROUP: HCPCS | Performed by: DIETITIAN, REGISTERED

## 2022-08-05 NOTE — PROGRESS NOTES
[facility]  Diabetes Self-Management Education & Support Program  Encounter Note    SUMMARY  Diabetes self-care management training was completed related to reducing risks. he participant will return on August 2022 to continue DSMES related to healthy eating and monitoring. The participant did identify SMART Goal(s) and will practice knowledge and skills related to reducing risks to improve diabetes self-management. EVALUATION:  Participant states that she keeps active by walking. She takes her medications regularly to receive the full benefit. She is not monitoring her blood glucose. She has not had a diabetic foot exam. She goes to the dentist regularly. She does not smoke. After class today, she is open to scheduling a dilated eye exam.  Participant was engaged in learning and asked appropriate questions. RECOMMENDATIONS:  Complete diabetes personal care record  Set up appointment for dilated eye exam     TOPICS DISCUSSED TODAY:  WHAT IS DIABETES? Minutes: Lupis Biswas 58? 61      Next provider visit is scheduled for 8/16/2022 c Dr. Niraj Ontiveros       SMART GOAL(S)  Practice monitoring self-care behavior by by setting up a dilatated eye exam over the next week. ACHIEVEMENT OF GOAL(S) : 0-24%         DATE DSMES TOPIC EVALUATION     8/5/2022 WHAT IS DIABETES? Role of the normal pancreas in energy balance and blood glucose control   The defect seen in diabetes   Signs & symptoms of diabetes   Diagnosis of diabetes   Types of diabetes   Blood glucose targets in non-pregnant & non-pregnant adults       The participant knows  Their type of diabetes: Yes  The basic physiologic defect: Yes  Blood glucose targets: Yes     DATE DSMES TOPIC EVALUATION     8/5/2022 HOW DO I STAY HEALTHY?    Prevention   Vaccinations   Preconception care (if applicable)  Examinations   Eye    Foot   Diabetic complications' prevention   Dental health   Heart health   Kidney Health   [facility] Sleep health      The participant has a personal diabetes care record to keep abreast of diabetes health Yes     The participant needs to address completing personal care record and monitoring BG levels. Arlys Libman, RD on 8/5/2022 at 3:33 PM    I have personally reviewed the health record, including provider notes, laboratory data and current medications before making these care and education recommendations. The time spent in this effort is included in the total time.   Total minutes: 12

## 2022-08-08 PROBLEM — R93.89 ABNORMAL ULTRASOUND OF PELVIS: Status: RESOLVED | Noted: 2022-05-19 | Resolved: 2022-08-08

## 2022-08-08 PROBLEM — R00.2 PALPITATION: Status: RESOLVED | Noted: 2021-06-11 | Resolved: 2022-08-08

## 2022-08-08 PROBLEM — Z12.31 SCREENING MAMMOGRAM FOR BREAST CANCER: Status: ACTIVE | Noted: 2022-05-01

## 2022-08-12 ENCOUNTER — CLINICAL SUPPORT (OUTPATIENT)
Dept: DIABETES SERVICES | Age: 52
End: 2022-08-12
Payer: COMMERCIAL

## 2022-08-12 DIAGNOSIS — E11.9 CONTROLLED TYPE 2 DIABETES MELLITUS WITHOUT COMPLICATION, WITHOUT LONG-TERM CURRENT USE OF INSULIN (HCC): Primary | ICD-10-CM

## 2022-08-12 PROCEDURE — G0109 DIAB MANAGE TRN IND/GROUP: HCPCS | Performed by: DIETITIAN, REGISTERED

## 2022-08-18 NOTE — PROGRESS NOTES
[employer] [facility]  Diabetes Self-Management Education & Support Program  Encounter Note    SUMMARY  Diabetes self-care management training was completed related to healthy eating. he participant will return on August 19 to continue DSMES related to monitoring, taking medications, and physical activity. The participant did identify SMART Goal(s) and will practice knowledge and skills related to reducing risks and healthy eating to improve diabetes self-management. EVALUATION:  She participated in group activity and demonstrated good understanding of designing a balanced 45-60 g CHO meal using the healthy plate platform. RECOMMENDATIONS:  Practice label reading and designing 45 g CHO meals for B/L/D; do not snack on CHO containing foods     TOPICS DISCUSSED TODAY:  What can I eat? 120 minutes      Next provider visit is scheduled for TBD; DSMES: 8/19/2022       SMART GOAL(S)  Practice building a balanced meal for breakfast, lunch and dinner at least 3 times over the next week  ACHIEVEMENT OF GOAL(S) : 0-24%           DATE DSMES TOPIC EVALUATION     8/18/2022 WHAT CAN I EAT? General principles   Determining a healthy weight   Nutritional terms & tools   Healthy Plate method   Carbohydrate Counting   Reading food labels   Free apps   Pregnancy recommendations      The participant   Uses Healthy Plate principles in constructing meals: Yes  Reads food labels in choosing acceptable foods: Yes    The participant needs to address label reading for total CHO per serving size and designing balanced 45 g cHO meals using healthy plate format. Rhonda Spence RD on 8/18/2022 at 4:33 PM    I have personally reviewed the health record, including provider notes, laboratory data and current medications before making these care and education recommendations. The time spent in this effort is included in the total time.   Total minutes: 120

## 2022-08-22 ENCOUNTER — TELEPHONE (OUTPATIENT)
Dept: INTERNAL MEDICINE CLINIC | Age: 52
End: 2022-08-22

## 2022-09-07 PROBLEM — Z12.31 SCREENING MAMMOGRAM FOR BREAST CANCER: Status: RESOLVED | Noted: 2022-05-01 | Resolved: 2022-09-07

## 2023-02-24 PROBLEM — Z12.11 SCREENING FOR COLON CANCER: Status: ACTIVE | Noted: 2022-05-01

## 2023-02-24 PROBLEM — E11.65 TYPE 2 DIABETES MELLITUS WITH HYPERGLYCEMIA, WITHOUT LONG-TERM CURRENT USE OF INSULIN (HCC): Status: ACTIVE | Noted: 2020-12-22

## 2023-03-26 PROBLEM — Z12.11 SCREENING FOR COLON CANCER: Status: RESOLVED | Noted: 2022-05-01 | Resolved: 2023-03-26

## 2023-03-27 DIAGNOSIS — N83.202 CYST OF LEFT OVARY: Primary | ICD-10-CM

## 2023-04-05 RX ORDER — LISINOPRIL AND HYDROCHLOROTHIAZIDE 12.5; 2 MG/1; MG/1
TABLET ORAL
Qty: 90 TABLET | Refills: 0 | Status: SHIPPED
Start: 2023-04-05

## 2023-04-06 ENCOUNTER — OFFICE VISIT (OUTPATIENT)
Dept: INTERNAL MEDICINE CLINIC | Age: 53
End: 2023-04-06
Payer: MEDICAID

## 2023-04-06 PROBLEM — G89.29 CHRONIC RIGHT-SIDED LOW BACK PAIN, UNSPECIFIED WHETHER SCIATICA PRESENT: Status: ACTIVE | Noted: 2023-04-06

## 2023-04-06 PROBLEM — M54.50 CHRONIC RIGHT-SIDED LOW BACK PAIN, UNSPECIFIED WHETHER SCIATICA PRESENT: Status: ACTIVE | Noted: 2023-04-06

## 2023-04-06 PROCEDURE — 82962 GLUCOSE BLOOD TEST: CPT | Performed by: INTERNAL MEDICINE

## 2023-04-06 PROCEDURE — 99214 OFFICE O/P EST MOD 30 MIN: CPT | Performed by: INTERNAL MEDICINE

## 2023-04-06 NOTE — PROGRESS NOTES
Christelle Frey (: 1970) is a 47 y.o. female, established patient, here for evaluation of the following chief complaint(s):  Follow Up Chronic Condition         ASSESSMENT/PLAN:  Below is the assessment and plan developed based on review of pertinent history, physical exam, labs, studies, and medications. 1. Chronic right-sided low back pain, unspecified whether sciatica present  2. Essential hypertension  -     CBC WITH AUTOMATED DIFF  -     METABOLIC PANEL, COMPREHENSIVE  3. Type 2 diabetes mellitus with hyperglycemia, without long-term current use of insulin (HCC)  -     CBC WITH AUTOMATED DIFF  -     METABOLIC PANEL, COMPREHENSIVE  -     HEMOGLOBIN A1C WITH EAG  -     MICROALBUMIN, UR, RAND W/ MICROALB/CREAT RATIO  -     AMB POC GLUCOSE BLOOD, BY GLUCOSE MONITORING DEVICE  4. Pure hypercholesterolemia  -     LIPID PANEL  5. Anxiety  6. Morbid obesity ([address]  7. Gastroesophageal reflux disease without esophagitis  8. Anemia, unspecified type  9. Plantar fasciitis, bilateral  10. Left knee pain, unspecified chronicity  11. Screening for colon cancer  -     REFERRAL TO GASTROENTEROLOGY  12. Need for hepatitis C screening test  -     HEPATITIS C AB      Hypertension, uncontrolled, I changed her dose. Lisinopril increased to 40 mg once a day continued on hydrochlorothiazide 12.5 mg once a day diet low in sodium. Labs ordered. Hypercholesteremia she is not sure and she did not bring medicine she thinks that she takes atorvastatin 10 mg at bedtime. Labs ordered. Diet low in saturated fat. Type 2 diabetes mellitus and last hemoglobin A1c was 7% she is non-insulin-dependent it looks like controlled but recently no labs available she does not check blood sugar at home and today her random blood sugar is 116 in the office. Diet low in sugar and starch.   She attended diabetic education classes and she wants to be on metformin she is thinks that she does not take glipizide and she is not sure, no good history available regarding her medicines. She takes Trulicity injection 1.5 mg once a week and she says it does not help she wants to be on metformin and recommended to wait until blood work comes back. She wants a CGM but she is not on insulin and explain her    Allergies taking fluticasone nasal spray. Take in loratadine. Anxiety taking Lexapro 20 mg/day. GERD taking omeprazole. Low back pain right-sided. Recently visited ER. She said she was given medication but she did not bring after visit summary to the ER and she had visited [facility] ER. She had taken physical therapy last summer. She says she is going to make appointmentwith ophthalmologist.    She wants to do colonoscopy not Cologuard. Diabetic education given. Answered all her questions. Refills given. Labs ordered. Return in about 3 months (around 7/6/2023) for diabetes, hypertension,cholesterol follow up BP CHECK WITH NURSE IN4 WEEKS. SUBJECTIVE/OBJECTIVE:  LILIANA Hough with pleasant personality came for regular follow-up after a long time. Her blood pressure is elevated. Medicine dose changed. She did not bring sugar log. Her random blood sugar is 116 in the office. She wants to take metformin not glipizide and she had called us that she does not want to be on metformin but she says that metformin will help better than Trulicity that she is taking. Not a good history available and she did not bring all medicines and she needs refills. She thinks that she is not taking glipizide and I had prescribed in place of metformin. As per the chart she has finished her diabetic education classes, she wants to do labs. She recently visited [facility] ER for back pain. It seems like she was prescribed Flexeril and meloxicam and she should not take meloxicam on a daily basis which can increase her blood pressure. No depression. She takes Lexapro for anxiety.   It seems like she is not working currently. Her BMI is 41.77. Allergies   Allergen Reactions    Shellfish Derived Anaphylaxis     Current Outpatient Medications   Medication Sig    lisinopriL (PRINIVIL, ZESTRIL) 40 mg tablet Take 1 Tablet by mouth Every morning. hydroCHLOROthiazide (HYDRODIURIL) 12.5 mg tablet Take 1 Tablet by mouth Every morning. omeprazole (PRILOSEC) 40 mg capsule Take 1 Capsule by mouth daily. Take  1 capsule ,30 minutes before eating once a day    escitalopram oxalate (LEXAPRO) 20 mg tablet Take 1 Tablet by mouth daily. atorvastatin (LIPITOR) 10 mg tablet Take 1 Tablet by mouth nightly. dulaglutide (Trulicity) 1.5 DACIA/3.1 mL sub-q pen 0.5 mL by SubCUTAneous route every seven (7) days. loratadine (CLARITIN) 10 mg tablet Take 1 Tablet by mouth daily. fluticasone propionate (FLONASE) 50 mcg/actuation nasal spray 2 Sprays by Both Nostrils route daily. azelastine (ASTELIN) 137 mcg (0.1 %) nasal spray 1 [place] by Both Nostrils route two (2) times a day. Blood-Glucose Meter monitoring kit Check sugar once a day    glucose blood VI test strips (blood glucose test) strip Check sugar once a day    lancets misc Check sugar once a day    cyclobenzaprine (FLEXERIL) 10 mg tablet Take 1 Tablet by mouth nightly as needed for Muscle Spasm(s). ferrous sulfate 325 mg (65 mg iron) tablet 1 Tablet. flash glucose sensor (FreeStyle Elliott 2 Sensor) kit 1 Each by Does Not Apply route four (4) times daily. flash glucose scanning reader (FreeStyle Elliott 2 [place]) misc 1 Each by Does Not Apply route four (4) times daily. flash glucose sensor (FreeStyle Elliott 14 Day Sensor) kit 1 Kit by Does Not Apply route every fourteen (14) days. Use to check blood sugars qid. Dx E11.9     No current facility-administered medications for this visit.      Past Medical History:   Diagnosis Date    Anemia     CHF (congestive heart failure) ([facility] 8/20/2020    Chronic right-sided low back pain, unspecified whether sciatica present 4/6/2023    Controlled type 2 diabetes mellitus with complication, without long-term current use of insulin [address] 12/22/2020    Depression     Essential hypertension 8/20/2020    Family history of cardiovascular disease 8/20/2020    Family history of diabetes mellitus (DM) 8/20/2020    GERD (gastroesophageal reflux disease)     Plantar fasciitis, bilateral 8/20/2020    Pure hypercholesterolemia 12/22/2020    Seasonal rhinitis 8/20/2020    Strain of hamstring 8/20/2020    Vitamin D deficiency 8/20/2020     Past Surgical History:   Procedure Laterality Date    HX GYN      Partial Hysterectomy    HX TUBAL LIGATION  1999    [place] UNLISTED PROCEDURE ABDOMEN PERITONEUM & OMENTUM       Family History   Problem Relation Age of Onset    Hypertension Mother     Diabetes Mother     Hypertension Father     Diabetes Father     Diabetes Sister     Hypertension Sister     Diabetes Brother     Hypertension Brother      Social History     Tobacco Use   Smoking Status Never   Smokeless Tobacco Never       Review of Systems   Constitutional: Negative. HENT:  Negative for congestion, sinus pain and sore throat. Eyes:  Negative for blurred vision. Respiratory:  Negative for cough, shortness of breath and wheezing. Cardiovascular: Negative. Gastrointestinal: Negative. Genitourinary: Negative. Musculoskeletal: Negative. Neurological:  Negative for dizziness, tingling and tremors. Endo/Heme/Allergies:  Negative for polydipsia. Does not bruise/bleed easily. Psychiatric/Behavioral: Negative. Vitals:    04/06/23 0903 04/06/23 0917 04/06/23 0944   BP: (!) 159/105 (!) 166/108 (!) 160/100   Pulse: 84  80   Resp: 18     Temp: 98.2 °F (36.8 °C)     TempSrc: Oral     SpO2: 95%     Weight: 228 lb 6.4 oz (103.6 kg)     Height: 5' 2\" (1.575 m)            Physical Exam  Vitals and nursing note reviewed. Constitutional:       General: She is not in acute distress. Appearance: Normal appearance.  She is not ill-appearing. Eyes:      Pupils: Pupils are equal, round, and reactive to light. Cardiovascular:      Rate and Rhythm: Normal rate and regular rhythm. Pulses: Normal pulses. Heart sounds: Normal heart sounds. No murmur heard. Pulmonary:      Effort: Pulmonary effort is normal.      Breath sounds: Normal breath sounds. No wheezing, rhonchi or rales. Abdominal:      General: Bowel sounds are normal. There is no distension. Palpations: Abdomen is soft. There is no mass. Tenderness: There is no abdominal tenderness. There is no guarding. Musculoskeletal:         General: No swelling or tenderness. Right lower leg: No edema. Left lower leg: No edema. Skin:     General: Skin is warm. Findings: No bruising. Neurological:      General: No focal deficit present. Mental Status: She is alert and oriented to person, place, and time. Mental status is at baseline. Cranial Nerves: No cranial nerve deficit. Motor: No weakness. Gait: Gait normal.   Psychiatric:         Mood and Affect: Mood normal.         Behavior: Behavior normal.       An electronic signature was used to authenticate this note.   -- Pinky Parks MD

## 2023-04-06 NOTE — PROGRESS NOTES
1. \"Have you been to the ER, urgent care clinic since your last visit? Hospitalized since your last visit? \" Yes When: 1 week ago Where: [facility] ER Reason for visit: Ride sided flank pain     2. \"Have you seen or consulted any other health care providers outside of the [address] [place] [address] since your last visit? \" No     3. For patients aged 39-70: Has the patient had a colonoscopy / FIT/ Cologuard? No      If the patient is female:    4. For patients aged 41-77: Has the patient had a mammogram within the past 2 years? Yes - Care Gap present. Most recent result on file      5. For patients aged 21-65: Has the patient had a pap smear?  No    Chief Complaint   Patient presents with    Follow Up Chronic Condition     Visit Vitals  BP (!) 159/105 (BP 1 Location: Left upper arm, BP Patient Position: Sitting, BP Cuff Size: Adult)   Pulse 84   Temp 98.2 °F (36.8 °C) (Oral)   Resp 18   Ht 5' 2\" (1.575 m)   Wt 228 lb 6.4 oz (103.6 kg)   SpO2 95%   BMI 41.77 kg/m²

## 2023-04-19 DIAGNOSIS — M54.9 CHRONIC RIGHT-SIDED BACK PAIN, UNSPECIFIED BACK LOCATION: Primary | ICD-10-CM

## 2023-04-19 DIAGNOSIS — G89.29 CHRONIC RIGHT-SIDED BACK PAIN, UNSPECIFIED BACK LOCATION: Primary | ICD-10-CM

## 2023-04-30 RX ORDER — AZELASTINE 1 MG/ML
1 SPRAY, METERED NASAL 2 TIMES DAILY
COMMUNITY
Start: 2021-11-12

## 2023-04-30 RX ORDER — LISINOPRIL 40 MG/1
40 TABLET ORAL EVERY MORNING
COMMUNITY
Start: 2023-04-06

## 2023-04-30 RX ORDER — LISINOPRIL AND HYDROCHLOROTHIAZIDE 20; 12.5 MG/1; MG/1
1 TABLET ORAL EVERY MORNING
COMMUNITY
Start: 2022-05-02

## 2023-04-30 RX ORDER — LANCETS 30 GAUGE
EACH MISCELLANEOUS
COMMUNITY
Start: 2022-05-19

## 2023-04-30 RX ORDER — FERROUS SULFATE 325(65) MG
325 TABLET ORAL
COMMUNITY

## 2023-04-30 RX ORDER — METFORMIN HYDROCHLORIDE 500 MG/1
500 TABLET, FILM COATED, EXTENDED RELEASE ORAL
COMMUNITY
Start: 2023-04-12

## 2023-04-30 RX ORDER — CYCLOBENZAPRINE HCL 10 MG
10 TABLET ORAL
COMMUNITY
Start: 2021-12-22

## 2023-04-30 RX ORDER — METHYLPREDNISOLONE 4 MG/1
TABLET ORAL
COMMUNITY
Start: 2022-06-08

## 2023-04-30 RX ORDER — OMEPRAZOLE 40 MG/1
40 CAPSULE, DELAYED RELEASE ORAL DAILY
COMMUNITY
Start: 2021-06-11

## 2023-04-30 RX ORDER — FLUTICASONE PROPIONATE 50 MCG
2 SPRAY, SUSPENSION (ML) NASAL DAILY
COMMUNITY
Start: 2020-12-22

## 2023-04-30 RX ORDER — DULAGLUTIDE 0.75 MG/.5ML
0.75 INJECTION, SOLUTION SUBCUTANEOUS
COMMUNITY
Start: 2022-05-19

## 2023-04-30 RX ORDER — ASPIRIN 81 MG/1
81 TABLET, CHEWABLE ORAL DAILY
COMMUNITY

## 2023-04-30 RX ORDER — ESCITALOPRAM OXALATE 20 MG/1
20 TABLET ORAL DAILY
COMMUNITY
Start: 2022-05-19

## 2023-04-30 RX ORDER — GLIPIZIDE 5 MG/1
TABLET ORAL
COMMUNITY
Start: 2022-05-19

## 2023-04-30 RX ORDER — FAMOTIDINE 20 MG/1
20 TABLET, FILM COATED ORAL 2 TIMES DAILY
COMMUNITY
Start: 2021-06-16

## 2023-04-30 RX ORDER — ATORVASTATIN CALCIUM 10 MG/1
1 TABLET, FILM COATED ORAL NIGHTLY
COMMUNITY
Start: 2022-05-19

## 2023-04-30 RX ORDER — HYDROCHLOROTHIAZIDE 12.5 MG/1
1 TABLET ORAL EVERY MORNING
COMMUNITY
Start: 2023-04-06

## 2023-04-30 RX ORDER — ALBUTEROL SULFATE 90 UG/1
AEROSOL, METERED RESPIRATORY (INHALATION)
COMMUNITY
Start: 2021-11-19

## 2023-04-30 RX ORDER — LORATADINE 10 MG/1
10 TABLET ORAL DAILY
COMMUNITY
Start: 2022-06-20

## 2023-05-05 PROBLEM — Z12.11 SCREENING FOR COLON CANCER: Status: RESOLVED | Noted: 2022-05-01 | Resolved: 2023-05-05

## 2023-05-10 ENCOUNTER — TELEPHONE (OUTPATIENT)
Facility: CLINIC | Age: 53
End: 2023-05-10

## 2023-05-10 NOTE — TELEPHONE ENCOUNTER
Pt made aware of mammogram results from [facility]. Pt reminded to follow up with her OBGYN and she stated that she has already followed up with the OBGYN.

## 2023-06-21 ENCOUNTER — TELEPHONE (OUTPATIENT)
Facility: CLINIC | Age: 53
End: 2023-06-21

## 2023-06-21 NOTE — TELEPHONE ENCOUNTER
Per patient BP is elevated. Came in to have it checked via nurse visit and it was good. 133/88. Checked at ortho 05/17 and was within normal range. Patient has not been documenting readings. Advised per Dr. Kathryn Lee to keep a log of bp readings. Be sure to take bp med before checking. Check bp and log for 1 week. If BP runs greater than 140/90 then she will make appropriate adjustments. BP cuff ordered through parachute for patient. Advised to avoid sodium and keep food log as well. Patient stated understanding.

## 2023-07-08 PROBLEM — E66.01 MORBID OBESITY (HCC): Status: RESOLVED | Noted: 2020-08-20 | Resolved: 2023-07-08

## 2023-07-08 PROBLEM — Z12.11 SCREENING FOR COLON CANCER: Status: ACTIVE | Noted: 2022-05-01

## 2023-07-13 ENCOUNTER — OFFICE VISIT (OUTPATIENT)
Facility: CLINIC | Age: 53
End: 2023-07-13
Payer: MEDICAID

## 2023-07-13 VITALS
WEIGHT: 228 LBS | SYSTOLIC BLOOD PRESSURE: 142 MMHG | TEMPERATURE: 97.8 F | HEIGHT: 62 IN | DIASTOLIC BLOOD PRESSURE: 90 MMHG | BODY MASS INDEX: 41.96 KG/M2 | HEART RATE: 72 BPM | OXYGEN SATURATION: 97 %

## 2023-07-13 DIAGNOSIS — I10 ESSENTIAL HYPERTENSION: ICD-10-CM

## 2023-07-13 DIAGNOSIS — J01.10 ACUTE FRONTAL SINUSITIS, RECURRENCE NOT SPECIFIED: ICD-10-CM

## 2023-07-13 DIAGNOSIS — G89.29 CHRONIC RIGHT-SIDED LOW BACK PAIN, UNSPECIFIED WHETHER SCIATICA PRESENT: ICD-10-CM

## 2023-07-13 DIAGNOSIS — M54.50 CHRONIC RIGHT-SIDED LOW BACK PAIN, UNSPECIFIED WHETHER SCIATICA PRESENT: ICD-10-CM

## 2023-07-13 DIAGNOSIS — E78.00 PURE HYPERCHOLESTEROLEMIA: ICD-10-CM

## 2023-07-13 DIAGNOSIS — Z12.11 SCREENING FOR COLON CANCER: ICD-10-CM

## 2023-07-13 DIAGNOSIS — E11.65 TYPE 2 DIABETES MELLITUS WITH HYPERGLYCEMIA, WITHOUT LONG-TERM CURRENT USE OF INSULIN (HCC): ICD-10-CM

## 2023-07-13 DIAGNOSIS — F41.9 ANXIETY: ICD-10-CM

## 2023-07-13 PROCEDURE — 99214 OFFICE O/P EST MOD 30 MIN: CPT | Performed by: INTERNAL MEDICINE

## 2023-07-13 PROCEDURE — 3075F SYST BP GE 130 - 139MM HG: CPT | Performed by: INTERNAL MEDICINE

## 2023-07-13 PROCEDURE — 3079F DIAST BP 80-89 MM HG: CPT | Performed by: INTERNAL MEDICINE

## 2023-07-13 PROCEDURE — 3044F HG A1C LEVEL LT 7.0%: CPT | Performed by: INTERNAL MEDICINE

## 2023-07-13 RX ORDER — LOSARTAN POTASSIUM AND HYDROCHLOROTHIAZIDE 12.5; 1 MG/1; MG/1
1 TABLET ORAL DAILY
Qty: 90 TABLET | Refills: 1 | Status: SHIPPED | OUTPATIENT
Start: 2023-07-13

## 2023-07-13 RX ORDER — AMOXICILLIN 875 MG/1
875 TABLET, COATED ORAL 2 TIMES DAILY
Qty: 20 TABLET | Refills: 0 | Status: SHIPPED | OUTPATIENT
Start: 2023-07-13 | End: 2023-07-23

## 2023-07-13 SDOH — ECONOMIC STABILITY: FOOD INSECURITY: WITHIN THE PAST 12 MONTHS, THE FOOD YOU BOUGHT JUST DIDN'T LAST AND YOU DIDN'T HAVE MONEY TO GET MORE.: OFTEN TRUE

## 2023-07-13 SDOH — ECONOMIC STABILITY: FOOD INSECURITY: WITHIN THE PAST 12 MONTHS, YOU WORRIED THAT YOUR FOOD WOULD RUN OUT BEFORE YOU GOT MONEY TO BUY MORE.: OFTEN TRUE

## 2023-07-13 SDOH — ECONOMIC STABILITY: INCOME INSECURITY: HOW HARD IS IT FOR YOU TO PAY FOR THE VERY BASICS LIKE FOOD, HOUSING, MEDICAL CARE, AND HEATING?: VERY HARD

## 2023-07-13 SDOH — ECONOMIC STABILITY: HOUSING INSECURITY
IN THE LAST 12 MONTHS, WAS THERE A TIME WHEN YOU DID NOT HAVE A STEADY PLACE TO SLEEP OR SLEPT IN A SHELTER (INCLUDING NOW)?: YES

## 2023-07-13 ASSESSMENT — ENCOUNTER SYMPTOMS
GASTROINTESTINAL NEGATIVE: 1
ALLERGIC/IMMUNOLOGIC NEGATIVE: 1
RESPIRATORY NEGATIVE: 1
EYES NEGATIVE: 1

## 2023-07-24 ENCOUNTER — TELEPHONE (OUTPATIENT)
Age: 53
End: 2023-07-24

## 2023-07-24 NOTE — TELEPHONE ENCOUNTER
Talked to patient regarding scheduling colonoscopy with Dr. Jeff Villalpando. Patient stated she would have to have it after September due to prior surgeries scheduled. Offered her the date of 10/03 and patient accepted.

## 2023-08-04 RX ORDER — METFORMIN HYDROCHLORIDE 500 MG/1
TABLET, EXTENDED RELEASE ORAL
Qty: 180 TABLET | Refills: 0 | Status: SHIPPED | OUTPATIENT
Start: 2023-08-04

## 2023-08-07 PROBLEM — Z12.11 SCREENING FOR COLON CANCER: Status: RESOLVED | Noted: 2022-05-01 | Resolved: 2023-08-07

## 2023-08-17 ENCOUNTER — OFFICE VISIT (OUTPATIENT)
Facility: CLINIC | Age: 53
End: 2023-08-17
Payer: MEDICAID

## 2023-08-17 VITALS
SYSTOLIC BLOOD PRESSURE: 122 MMHG | WEIGHT: 230 LBS | HEIGHT: 67 IN | TEMPERATURE: 98.4 F | DIASTOLIC BLOOD PRESSURE: 83 MMHG | OXYGEN SATURATION: 96 % | HEART RATE: 86 BPM | BODY MASS INDEX: 36.1 KG/M2 | RESPIRATION RATE: 16 BRPM

## 2023-08-17 DIAGNOSIS — E55.9 VITAMIN D DEFICIENCY: ICD-10-CM

## 2023-08-17 DIAGNOSIS — E66.01 CLASS 2 SEVERE OBESITY DUE TO EXCESS CALORIES WITH SERIOUS COMORBIDITY AND BODY MASS INDEX (BMI) OF 36.0 TO 36.9 IN ADULT (HCC): ICD-10-CM

## 2023-08-17 DIAGNOSIS — Z83.3 FAMILY HISTORY OF DIABETES MELLITUS (DM): ICD-10-CM

## 2023-08-17 DIAGNOSIS — Z12.11 COLON CANCER SCREENING: ICD-10-CM

## 2023-08-17 DIAGNOSIS — Z90.710 S/P HYSTERECTOMY: ICD-10-CM

## 2023-08-17 DIAGNOSIS — K21.9 GASTROESOPHAGEAL REFLUX DISEASE WITHOUT ESOPHAGITIS: ICD-10-CM

## 2023-08-17 DIAGNOSIS — J30.89 NON-SEASONAL ALLERGIC RHINITIS, UNSPECIFIED TRIGGER: ICD-10-CM

## 2023-08-17 DIAGNOSIS — E11.65 TYPE 2 DIABETES MELLITUS WITH HYPERGLYCEMIA, WITHOUT LONG-TERM CURRENT USE OF INSULIN (HCC): Primary | ICD-10-CM

## 2023-08-17 DIAGNOSIS — D64.9 ANEMIA, UNSPECIFIED TYPE: ICD-10-CM

## 2023-08-17 DIAGNOSIS — G89.29 CHRONIC RIGHT-SIDED LOW BACK PAIN, UNSPECIFIED WHETHER SCIATICA PRESENT: ICD-10-CM

## 2023-08-17 DIAGNOSIS — M54.50 CHRONIC RIGHT-SIDED LOW BACK PAIN, UNSPECIFIED WHETHER SCIATICA PRESENT: ICD-10-CM

## 2023-08-17 PROBLEM — E66.812 CLASS 2 SEVERE OBESITY DUE TO EXCESS CALORIES WITH SERIOUS COMORBIDITY AND BODY MASS INDEX (BMI) OF 36.0 TO 36.9 IN ADULT: Status: ACTIVE | Noted: 2023-08-17

## 2023-08-17 LAB
GLUCOSE, POC: 200 MG/DL
HBA1C MFR BLD: 7 %

## 2023-08-17 PROCEDURE — 99214 OFFICE O/P EST MOD 30 MIN: CPT | Performed by: FAMILY MEDICINE

## 2023-08-17 PROCEDURE — 3044F HG A1C LEVEL LT 7.0%: CPT | Performed by: FAMILY MEDICINE

## 2023-08-17 PROCEDURE — 83036 HEMOGLOBIN GLYCOSYLATED A1C: CPT | Performed by: FAMILY MEDICINE

## 2023-08-17 PROCEDURE — 3074F SYST BP LT 130 MM HG: CPT | Performed by: FAMILY MEDICINE

## 2023-08-17 PROCEDURE — 3079F DIAST BP 80-89 MM HG: CPT | Performed by: FAMILY MEDICINE

## 2023-08-17 PROCEDURE — 82962 GLUCOSE BLOOD TEST: CPT | Performed by: FAMILY MEDICINE

## 2023-08-17 RX ORDER — FEXOFENADINE HCL AND PSEUDOEPHEDRINE HCI 60; 120 MG/1; MG/1
1 TABLET, EXTENDED RELEASE ORAL 2 TIMES DAILY PRN
Qty: 20 TABLET | Refills: 1 | Status: SHIPPED | OUTPATIENT
Start: 2023-08-17 | End: 2023-08-27

## 2023-08-17 NOTE — PROGRESS NOTES
Robson Cooley is a 47 y.o. female and presents with New Patient and Establish Care  . HPI with a hx of HTN and Diabetes  47 y.o. Patient here on a routine follow up with no recent symptoms    Subjective:  Cardiovascular Review:  The patient has hypertension   Diet and Lifestyle: generally follows a low fat low cholesterol diet, generally follows a low sodium diet, exercises sporadically  Home BP Monitoring: is not measured at home. Pertinent ROS: taking medications as instructed, no medication side effects noted, no TIA's, no chest pain on exertion, no dyspnea on exertion, no swelling of ankles.      Review of Systems  Review of Systems - ENT ROS: positive for - nasal congestion and nasal discharge       Past Medical History:   Diagnosis Date    Anemia     Back pain     Congestive heart failure (CHF) (HCC)     Depression     GERD (gastroesophageal reflux disease)     High cholesterol     Hypertension     Plantar fasciitis     Strain of hamstring     Type 2 diabetes mellitus (HCC)     Vitamin D deficiency      Past Surgical History:   Procedure Laterality Date    HYSTERECTOMY (CERVIX STATUS UNKNOWN)      TUBAL LIGATION       Social History     Socioeconomic History    Marital status: Single     Spouse name: None    Number of children: None    Years of education: None    Highest education level: None   Tobacco Use    Smoking status: Never    Smokeless tobacco: Never   Substance and Sexual Activity    Alcohol use: Not Currently    Drug use: Not Currently    Sexual activity: Yes     Birth control/protection: Surgical     Social Determinants of Health     Financial Resource Strain: High Risk    Difficulty of Paying Living Expenses: Very hard   Food Insecurity: Food Insecurity Present    Worried About Running Out of Food in the Last Year: Often true    Ran Out of Food in the Last Year: Often true   Transportation Needs: Unmet Transportation Needs    Lack of Transportation (Non-Medical): Yes   Housing Stability: High

## 2023-08-31 RX ORDER — POLYETHYLENE GLYCOL 3350, SODIUM CHLORIDE, POTASSIUM CHLORIDE, SODIUM BICARBONATE, AND SODIUM SULFATE 240; 5.84; 2.98; 6.72; 22.72 G/4L; G/4L; G/4L; G/4L; G/4L
4000 POWDER, FOR SOLUTION ORAL ONCE
Qty: 4000 ML | Refills: 0 | Status: SHIPPED | OUTPATIENT
Start: 2023-08-31 | End: 2023-08-31

## 2023-09-16 PROBLEM — Z12.11 COLON CANCER SCREENING: Status: RESOLVED | Noted: 2023-08-17 | Resolved: 2023-09-16

## 2023-10-10 ENCOUNTER — TELEPHONE (OUTPATIENT)
Age: 53
End: 2023-10-10

## 2023-10-10 NOTE — TELEPHONE ENCOUNTER
Called patient regarding scheduling surgery with Dr. Sophia Richter for colonoscopy. Informed patient prep information will be sent out via mail. Patient understood.

## 2023-10-13 ENCOUNTER — OFFICE VISIT (OUTPATIENT)
Facility: CLINIC | Age: 53
End: 2023-10-13

## 2023-10-13 VITALS
RESPIRATION RATE: 16 BRPM | DIASTOLIC BLOOD PRESSURE: 73 MMHG | WEIGHT: 228 LBS | HEART RATE: 88 BPM | TEMPERATURE: 98.1 F | BODY MASS INDEX: 35.79 KG/M2 | OXYGEN SATURATION: 98 % | HEIGHT: 67 IN | SYSTOLIC BLOOD PRESSURE: 125 MMHG

## 2023-10-13 DIAGNOSIS — Z09 HOSPITAL DISCHARGE FOLLOW-UP: Primary | ICD-10-CM

## 2023-10-13 DIAGNOSIS — S21.102A OPEN WOUND OF LEFT CHEST WALL, INITIAL ENCOUNTER: ICD-10-CM

## 2023-10-13 DIAGNOSIS — S20.212A CONTUSION OF LEFT CHEST WALL, INITIAL ENCOUNTER: ICD-10-CM
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
## 2023-10-14 PROBLEM — S20.212A CONTUSION OF LEFT CHEST WALL: Status: ACTIVE | Noted: 2023-10-14

## 2023-10-31 ENCOUNTER — TELEPHONE (OUTPATIENT)
Age: 53
End: 2023-10-31

## 2023-10-31 NOTE — TELEPHONE ENCOUNTER
Called patient to let her know that her insurance is terminated today. Patient stated she will call and give us a call back.

## 2023-11-02 ENCOUNTER — TELEPHONE (OUTPATIENT)
Age: 53
End: 2023-11-02

## 2023-11-02 NOTE — TELEPHONE ENCOUNTER
Lvm to patient to let her know colonoscopy will have to be canceled due to not reaching back out regarding insurance since its been terminated on 10/31.
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
## 2023-11-12 PROBLEM — Z09 HOSPITAL DISCHARGE FOLLOW-UP: Status: RESOLVED | Noted: 2023-10-13 | Resolved: 2023-11-12

## 2024-03-08 RX ORDER — LOSARTAN POTASSIUM AND HYDROCHLOROTHIAZIDE 12.5; 1 MG/1; MG/1
1 TABLET ORAL DAILY
Qty: 90 TABLET | Refills: 1 | Status: SHIPPED | OUTPATIENT
Start: 2024-03-08

## 2024-03-08 RX ORDER — LORATADINE 10 MG/1
10 TABLET ORAL DAILY
Qty: 1 TABLET | Refills: 0 | Status: SHIPPED | OUTPATIENT
Start: 2024-03-08

## 2024-03-08 RX ORDER — METFORMIN HYDROCHLORIDE 500 MG/1
TABLET, EXTENDED RELEASE ORAL
Qty: 180 TABLET | Refills: 0 | Status: SHIPPED | OUTPATIENT
Start: 2024-03-08

## 2024-03-08 NOTE — TELEPHONE ENCOUNTER
Patient is requesting refills on   metFORMIN (GLUCOPHAGE-XR) 500 MG extended release tablet     losartan-hydroCHLOROthiazide (HYZAAR) 100-12.5 MG per tablet      cyanocobalamin 1000 MCG tablet     loratadine (CLARITIN) 10 MG tablet     Patient has appointment on 3/22/24

## 2024-03-22 ENCOUNTER — OFFICE VISIT (OUTPATIENT)
Facility: CLINIC | Age: 54
End: 2024-03-22
Payer: COMMERCIAL

## 2024-03-22 VITALS
HEART RATE: 84 BPM | TEMPERATURE: 98.5 F | SYSTOLIC BLOOD PRESSURE: 132 MMHG | BODY MASS INDEX: 36.1 KG/M2 | WEIGHT: 230 LBS | OXYGEN SATURATION: 96 % | DIASTOLIC BLOOD PRESSURE: 92 MMHG | HEIGHT: 67 IN

## 2024-03-22 DIAGNOSIS — K21.9 GASTROESOPHAGEAL REFLUX DISEASE WITHOUT ESOPHAGITIS: ICD-10-CM

## 2024-03-22 DIAGNOSIS — J30.89 NON-SEASONAL ALLERGIC RHINITIS DUE TO OTHER ALLERGIC TRIGGER: ICD-10-CM

## 2024-03-22 DIAGNOSIS — D64.9 ANEMIA, UNSPECIFIED TYPE: ICD-10-CM

## 2024-03-22 DIAGNOSIS — M72.2 PLANTAR FASCIITIS, BILATERAL: ICD-10-CM

## 2024-03-22 DIAGNOSIS — Z79.4 CONTROLLED TYPE 2 DIABETES MELLITUS WITHOUT COMPLICATION, WITH LONG-TERM CURRENT USE OF INSULIN (HCC): Primary | ICD-10-CM

## 2024-03-22 DIAGNOSIS — E55.9 VITAMIN D DEFICIENCY: ICD-10-CM

## 2024-03-22 DIAGNOSIS — E78.2 MIXED HYPERLIPIDEMIA: ICD-10-CM

## 2024-03-22 DIAGNOSIS — E11.9 CONTROLLED TYPE 2 DIABETES MELLITUS WITHOUT COMPLICATION, WITH LONG-TERM CURRENT USE OF INSULIN (HCC): Primary | ICD-10-CM

## 2024-03-22 DIAGNOSIS — Z12.11 COLON CANCER SCREENING: ICD-10-CM

## 2024-03-22 LAB — HBA1C MFR BLD: 6.6 %

## 2024-03-22 PROCEDURE — 3075F SYST BP GE 130 - 139MM HG: CPT | Performed by: FAMILY MEDICINE

## 2024-03-22 PROCEDURE — 83036 HEMOGLOBIN GLYCOSYLATED A1C: CPT | Performed by: FAMILY MEDICINE

## 2024-03-22 PROCEDURE — 3080F DIAST BP >= 90 MM HG: CPT | Performed by: FAMILY MEDICINE

## 2024-03-22 PROCEDURE — 99214 OFFICE O/P EST MOD 30 MIN: CPT | Performed by: FAMILY MEDICINE

## 2024-03-22 RX ORDER — LORATADINE 10 MG/1
10 TABLET ORAL DAILY
Qty: 1 TABLET | Refills: 0 | Status: SHIPPED | OUTPATIENT
Start: 2024-03-22

## 2024-03-22 RX ORDER — METFORMIN HYDROCHLORIDE 500 MG/1
500 TABLET, FILM COATED, EXTENDED RELEASE ORAL 2 TIMES DAILY WITH MEALS
Qty: 60 TABLET | Refills: 2 | Status: SHIPPED | OUTPATIENT
Start: 2024-03-22 | End: 2024-04-21

## 2024-03-22 RX ORDER — METFORMIN HYDROCHLORIDE 500 MG/1
TABLET, EXTENDED RELEASE ORAL
Qty: 180 TABLET | Refills: 0 | Status: SHIPPED | OUTPATIENT
Start: 2024-03-22

## 2024-03-22 RX ORDER — OMEPRAZOLE 40 MG/1
40 CAPSULE, DELAYED RELEASE ORAL DAILY
Qty: 30 CAPSULE | Refills: 1 | Status: SHIPPED | OUTPATIENT
Start: 2024-03-22

## 2024-03-22 RX ORDER — ATORVASTATIN CALCIUM 10 MG/1
10 TABLET, FILM COATED ORAL NIGHTLY
Qty: 30 TABLET | Refills: 1 | Status: SHIPPED | OUTPATIENT
Start: 2024-03-22

## 2024-03-22 RX ORDER — ASPIRIN 81 MG/1
81 TABLET, CHEWABLE ORAL DAILY
Qty: 30 TABLET | Refills: 1 | Status: SHIPPED | OUTPATIENT
Start: 2024-03-22

## 2024-03-22 RX ORDER — FERROUS SULFATE 325(65) MG
325 TABLET ORAL
Qty: 30 TABLET | Refills: 2 | Status: SHIPPED | OUTPATIENT
Start: 2024-03-22

## 2024-03-22 RX ORDER — ESCITALOPRAM OXALATE 20 MG/1
20 TABLET ORAL DAILY
Qty: 30 TABLET | Refills: 1 | Status: SHIPPED | OUTPATIENT
Start: 2024-03-22

## 2024-03-22 RX ORDER — ALBUTEROL SULFATE 90 UG/1
AEROSOL, METERED RESPIRATORY (INHALATION)
Qty: 18 G | Refills: 1 | Status: SHIPPED | OUTPATIENT
Start: 2024-03-22

## 2024-03-22 RX ORDER — AMLODIPINE BESYLATE 5 MG/1
5 TABLET ORAL DAILY
COMMUNITY
Start: 2023-08-18 | End: 2024-03-22 | Stop reason: SDUPTHER

## 2024-03-22 RX ORDER — AZELASTINE 1 MG/ML
1 SPRAY, METERED NASAL 2 TIMES DAILY
Qty: 30 ML | Refills: 1 | Status: SHIPPED | OUTPATIENT
Start: 2024-03-22

## 2024-03-22 RX ORDER — LANCETS 30 GAUGE
EACH MISCELLANEOUS
Qty: 100 EACH | Status: CANCELLED | OUTPATIENT
Start: 2024-03-22

## 2024-03-22 RX ORDER — AMLODIPINE BESYLATE 5 MG/1
5 TABLET ORAL DAILY
Qty: 30 TABLET | Refills: 2 | Status: SHIPPED | OUTPATIENT
Start: 2024-03-22

## 2024-03-22 RX ORDER — LOSARTAN POTASSIUM AND HYDROCHLOROTHIAZIDE 12.5; 1 MG/1; MG/1
1 TABLET ORAL DAILY
Qty: 90 TABLET | Refills: 1 | Status: SHIPPED | OUTPATIENT
Start: 2024-03-22

## 2024-03-22 RX ORDER — FLUTICASONE PROPIONATE 50 MCG
2 SPRAY, SUSPENSION (ML) NASAL DAILY
Qty: 16 G | Refills: 1 | Status: CANCELLED | OUTPATIENT
Start: 2024-03-22

## 2024-03-22 ASSESSMENT — PATIENT HEALTH QUESTIONNAIRE - PHQ9
SUM OF ALL RESPONSES TO PHQ QUESTIONS 1-9: 0
1. LITTLE INTEREST OR PLEASURE IN DOING THINGS: NOT AT ALL
2. FEELING DOWN, DEPRESSED OR HOPELESS: NOT AT ALL
SUM OF ALL RESPONSES TO PHQ QUESTIONS 1-9: 0
SUM OF ALL RESPONSES TO PHQ QUESTIONS 1-9: 0
SUM OF ALL RESPONSES TO PHQ9 QUESTIONS 1 & 2: 0
SUM OF ALL RESPONSES TO PHQ QUESTIONS 1-9: 0

## 2024-03-22 NOTE — PROGRESS NOTES
\"Have you been to the ER, urgent care clinic since your last visit?  Hospitalized since your last visit?\"    NO    “Have you seen or consulted any other health care providers outside of [facility] since your last visit?”    NO        “Have you had a colorectal cancer screening such as a colonoscopy/FIT/Cologuard?    NO  Pt is here for 3 month follow up , patient says she has a sinus infection    Chief Complaint   Patient presents with    3 Month Follow-Up     BP (!) 132/92 (Site: Right Upper Arm, Position: Sitting, Cuff Size: Large Adult)   Pulse 84   Temp 98.5 °F (36.9 °C) (Temporal)   Ht 1.702 m (5' 7\")   Wt 104.3 kg (230 lb)   SpO2 96%   BMI 36.02 kg/m²         Click Here for Release of Records Request    
daily      metFORMIN, MOD, (GLUMETZA) 500 MG extended release tablet Take 1 tablet by mouth (Patient not taking: Reported on 8/17/2023)       No current facility-administered medications for this visit.     Allergies   Allergen Reactions    Shellfish-Derived Products Anaphylaxis       Objective:  BP (!) 132/92 (Site: Right Upper Arm, Position: Sitting, Cuff Size: Large Adult)   Pulse 84   Temp 98.5 °F (36.9 °C) (Temporal)   Ht 1.702 m (5' 7\")   Wt 104.3 kg (230 lb)   SpO2 96%   BMI 36.02 kg/m²     Physical Exam:   Physical Exam  Vitals and nursing note reviewed.   Constitutional:       Appearance: Normal appearance. She is obese.   HENT:      Head: Normocephalic and atraumatic.      Right Ear: Tympanic membrane, ear canal and external ear normal.      Left Ear: Tympanic membrane, ear canal and external ear normal.      Nose: Nose normal.      Mouth/Throat:      Mouth: Mucous membranes are moist.      Pharynx: Oropharynx is clear.   Eyes:      Extraocular Movements: Extraocular movements intact.      Pupils: Pupils are equal, round, and reactive to light.   Cardiovascular:      Rate and Rhythm: Normal rate and regular rhythm.      Pulses: Normal pulses.      Heart sounds: Normal heart sounds.   Pulmonary:      Effort: Pulmonary effort is normal.      Breath sounds: Normal breath sounds.   Abdominal:      General: Abdomen is flat. Bowel sounds are normal.      Palpations: Abdomen is soft.   Musculoskeletal:         General: Normal range of motion.      Cervical back: Normal range of motion and neck supple.   Skin:     General: Skin is warm and dry.      Capillary Refill: Capillary refill takes less than 2 seconds.   Neurological:      General: No focal deficit present.      Mental Status: She is alert and oriented to person, place, and time. Mental status is at baseline.   Psychiatric:         Mood and Affect: Mood normal.         Behavior: Behavior normal.         Thought Content: Thought content normal.

## 2024-04-16 RX ORDER — ATORVASTATIN CALCIUM 10 MG/1
10 TABLET, FILM COATED ORAL NIGHTLY
Qty: 90 TABLET | Refills: 1 | Status: SHIPPED | OUTPATIENT
Start: 2024-04-16

## 2024-04-16 RX ORDER — OMEPRAZOLE 40 MG/1
CAPSULE, DELAYED RELEASE ORAL DAILY
Qty: 90 CAPSULE | Refills: 1 | Status: SHIPPED | OUTPATIENT
Start: 2024-04-16 | End: 2024-04-16 | Stop reason: SDUPTHER

## 2024-04-16 RX ORDER — AZELASTINE HYDROCHLORIDE 137 UG/1
SPRAY, METERED NASAL
Qty: 1 ML | Refills: 0 | Status: SHIPPED | OUTPATIENT
Start: 2024-04-16

## 2024-04-16 RX ORDER — OMEPRAZOLE 40 MG/1
40 CAPSULE, DELAYED RELEASE ORAL DAILY
Qty: 90 CAPSULE | Refills: 0 | Status: SHIPPED | OUTPATIENT
Start: 2024-04-16 | End: 2024-07-15

## 2024-04-16 RX ORDER — ESCITALOPRAM OXALATE 20 MG/1
20 TABLET ORAL DAILY
Qty: 90 TABLET | Refills: 1 | Status: SHIPPED | OUTPATIENT
Start: 2024-04-16

## 2024-04-16 RX ORDER — AZELASTINE HYDROCHLORIDE 137 UG/1
SPRAY, METERED NASAL
Qty: 1 ML | Refills: 0 | Status: SHIPPED | OUTPATIENT
Start: 2024-04-16 | End: 2024-04-16 | Stop reason: SDUPTHER

## 2024-04-16 RX ORDER — ESCITALOPRAM OXALATE 20 MG/1
20 TABLET ORAL DAILY
Qty: 90 TABLET | Refills: 1 | Status: SHIPPED | OUTPATIENT
Start: 2024-04-16 | End: 2024-04-16 | Stop reason: SDUPTHER

## 2024-04-16 RX ORDER — ATORVASTATIN CALCIUM 10 MG/1
10 TABLET, FILM COATED ORAL NIGHTLY
Qty: 90 TABLET | Refills: 1 | Status: SHIPPED | OUTPATIENT
Start: 2024-04-16 | End: 2024-04-16 | Stop reason: SDUPTHER

## 2024-04-21 PROBLEM — Z12.11 COLON CANCER SCREENING: Status: RESOLVED | Noted: 2024-03-22 | Resolved: 2024-04-21

## 2024-05-24 ENCOUNTER — OFFICE VISIT (OUTPATIENT)
Age: 54
End: 2024-05-24
Payer: COMMERCIAL

## 2024-05-24 ENCOUNTER — TELEPHONE (OUTPATIENT)
Age: 54
End: 2024-05-24

## 2024-05-24 VITALS
RESPIRATION RATE: 16 BRPM | DIASTOLIC BLOOD PRESSURE: 78 MMHG | SYSTOLIC BLOOD PRESSURE: 124 MMHG | BODY MASS INDEX: 35.16 KG/M2 | OXYGEN SATURATION: 97 % | HEIGHT: 67 IN | HEART RATE: 79 BPM | WEIGHT: 224 LBS

## 2024-05-24 DIAGNOSIS — R42 DIZZINESS: Primary | ICD-10-CM

## 2024-05-24 PROCEDURE — 3074F SYST BP LT 130 MM HG: CPT | Performed by: OTOLARYNGOLOGY

## 2024-05-24 PROCEDURE — 3078F DIAST BP <80 MM HG: CPT | Performed by: OTOLARYNGOLOGY

## 2024-05-24 PROCEDURE — 99214 OFFICE O/P EST MOD 30 MIN: CPT | Performed by: OTOLARYNGOLOGY

## 2024-05-24 RX ORDER — MECLIZINE HYDROCHLORIDE 25 MG/1
25 TABLET ORAL 3 TIMES DAILY PRN
Qty: 30 TABLET | Refills: 0 | Status: SHIPPED | OUTPATIENT
Start: 2024-05-24 | End: 2024-06-03

## 2024-05-24 ASSESSMENT — PATIENT HEALTH QUESTIONNAIRE - PHQ9
SUM OF ALL RESPONSES TO PHQ QUESTIONS 1-9: 0
SUM OF ALL RESPONSES TO PHQ QUESTIONS 1-9: 0
2. FEELING DOWN, DEPRESSED OR HOPELESS: NOT AT ALL
SUM OF ALL RESPONSES TO PHQ QUESTIONS 1-9: 0
SUM OF ALL RESPONSES TO PHQ QUESTIONS 1-9: 0
1. LITTLE INTEREST OR PLEASURE IN DOING THINGS: NOT AT ALL
SUM OF ALL RESPONSES TO PHQ9 QUESTIONS 1 & 2: 0

## 2024-05-24 NOTE — PROGRESS NOTES
VI) strip Check sugar once a day    cyanocobalamin 1,000 mcg, Oral, DAILY    escitalopram (LEXAPRO) 20 mg, Oral, DAILY    ferrous sulfate (IRON 325) 325 mg, Oral, DAILY WITH BREAKFAST    fluticasone (FLONASE) 50 MCG/ACT nasal spray 2 sprays, Nasal, DAILY    Lancets MISC Check sugar once a day    loratadine (CLARITIN) 10 mg, Oral, DAILY    losartan-hydroCHLOROthiazide (HYZAAR) 100-12.5 MG per tablet 1 tablet, Oral, DAILY    metFORMIN (GLUCOPHAGE-XR) 500 MG extended release tablet TAKE 1 TABLET (500 MG)  BY MOUTH TWICE DAILY WITH MEALS    metFORMIN (MOD) (GLUMETZA) 500 mg, Oral, 2 TIMES DAILY WITH MEALS    omeprazole (PRILOSEC) 40 mg, Oral, DAILY       Allergies:   Allergies   Allergen Reactions    Shellfish-Derived Products Anaphylaxis       Social History:   Social History     Tobacco Use    Smoking status: Never    Smokeless tobacco: Never   Substance Use Topics    Alcohol use: Not Currently    Drug use: Not Currently       Family History:  Family History   Problem Relation Age of Onset    Hypertension Mother     Diabetes Mother     Hypertension Father     Diabetes Father     Hypertension Sister     Diabetes Sister     Hypertension Brother     Diabetes Brother        Review of Systems:  Consitutional: denies fever, excessive weight gain or loss.  Eyes: denies diplopia, eye pain.  Integumentary: denies new concerning skin lesions.  Ears, Nose, Mouth, Throat: denies except as per HPI.  Endocrine: denies hot or cold intolerance, increased thirst.  Respiratory: denies cough, hemoptysis, wheezing  Gastrointestinal: denies trouble swallowing, nausea, emesis, regurgitation  Musculoskeletal: denies muscle weakness or wasting  Cardiovascular: denies chest pain, shortness of breath  Neurologic: denies seizures, numbness or tingling, syncope  Hematologic: denies easy bleeding or bruising    Physical Examination:   There were no vitals taken for this visit.      General: Comfortable, pleasant, appears stated age  Voice:

## 2024-05-24 NOTE — TELEPHONE ENCOUNTER
Pt stated that she has 2 jobs and will like to  her accommodation letter since she is unsure of the fax #. I informed pt that you are still in clinic and once the letter is done I will give her a call.

## 2024-05-28 ENCOUNTER — TELEPHONE (OUTPATIENT)
Age: 54
End: 2024-05-28

## 2024-05-28 NOTE — TELEPHONE ENCOUNTER
Attempted to call pt to inform her that her work note was ready and her mailbox was not set up an was unable to lvm

## 2024-06-18 ENCOUNTER — OFFICE VISIT (OUTPATIENT)
Age: 54
End: 2024-06-18
Payer: COMMERCIAL

## 2024-06-18 VITALS
WEIGHT: 227 LBS | BODY MASS INDEX: 35.63 KG/M2 | SYSTOLIC BLOOD PRESSURE: 122 MMHG | OXYGEN SATURATION: 97 % | RESPIRATION RATE: 16 BRPM | HEART RATE: 96 BPM | HEIGHT: 67 IN | DIASTOLIC BLOOD PRESSURE: 66 MMHG

## 2024-06-18 DIAGNOSIS — R42 LIGHTHEADEDNESS: ICD-10-CM

## 2024-06-18 DIAGNOSIS — R42 DIZZINESS: Primary | ICD-10-CM

## 2024-06-18 PROCEDURE — 99214 OFFICE O/P EST MOD 30 MIN: CPT | Performed by: OTOLARYNGOLOGY

## 2024-06-18 PROCEDURE — 3074F SYST BP LT 130 MM HG: CPT | Performed by: OTOLARYNGOLOGY

## 2024-06-18 PROCEDURE — 3078F DIAST BP <80 MM HG: CPT | Performed by: OTOLARYNGOLOGY

## 2024-06-18 NOTE — PROGRESS NOTES
or tingling, syncope  Hematologic: denies easy bleeding or bruising    Physical Examination:   /66   Pulse 96   Resp 16   Ht 1.702 m (5' 7\")   Wt 103 kg (227 lb)   SpO2 97%   BMI 35.55 kg/m²       General: Comfortable, pleasant, appears stated age  Voice: Strong, speaking in full sentences, no stridor    Face: No masses or lesions, facial strength symmetric   Ears: External ears unremarkable. Bilateral ear canal clear. Tympanic membrane clear and intact, with visible landmarks. Clear middle ear space  Nose: External nose unremarkable. Dorsum midline. Anterior rhinoscopy demonstrates no lesions. Septum midline. Turbinates without hypertrophy.  Oral Cavity / Oropharynx: No trismus. Mucosa pink and moist. No lesions. Tongue is midline and mobile. Palate elevates symmetrically. Uvula midline. Tonsils unremarkable. Base of tongue soft. Floor of mouth soft.   Neck: Supple. No adenopathy. Thyroid unremarkable. Palpable laryngeal landmarks. Full neck range of motion   Neurologic: CN II - XI intact.  No spontaneous or gaze induced dizziness.     Assessment and Plan:   Dizziness  -Etiology of dizziness is not clear  -She was seen in the emergency room with some workup there.  I do not have access to these records  -Portions of history possibly suggest BPPV but others are inconsistent. Exercises and Epley maneuver were not helpful for her  -Meclizine has also not been helpful  -She describes more of a lightheaded sensation.  She also has associated chest tightness and dyspnea on exertion.  -Agree with cardiology workup ongoing  -Discussed with her that she may seek emergency department evaluation given her chest and dyspnea symptoms  -Pending her upcoming test with cardiology, from my end I would consider an MRI of her brain.  She can let us know if no additional cardiac workup and then I can order this.    The patient was instructed to return to clinic if no improvement or progression of symptoms. Signs to watch

## 2024-06-19 ENCOUNTER — OFFICE VISIT (OUTPATIENT)
Facility: CLINIC | Age: 54
End: 2024-06-19
Payer: COMMERCIAL

## 2024-06-19 VITALS
HEART RATE: 77 BPM | TEMPERATURE: 98.3 F | HEIGHT: 67 IN | RESPIRATION RATE: 16 BRPM | WEIGHT: 230.2 LBS | SYSTOLIC BLOOD PRESSURE: 128 MMHG | BODY MASS INDEX: 36.13 KG/M2 | DIASTOLIC BLOOD PRESSURE: 88 MMHG | OXYGEN SATURATION: 97 %

## 2024-06-19 DIAGNOSIS — R42 VERTIGO: ICD-10-CM

## 2024-06-19 DIAGNOSIS — K21.9 GASTROESOPHAGEAL REFLUX DISEASE WITHOUT ESOPHAGITIS: ICD-10-CM

## 2024-06-19 DIAGNOSIS — I20.89 ANGINA AT REST (HCC): ICD-10-CM

## 2024-06-19 DIAGNOSIS — E66.01 CLASS 2 SEVERE OBESITY DUE TO EXCESS CALORIES WITH SERIOUS COMORBIDITY AND BODY MASS INDEX (BMI) OF 36.0 TO 36.9 IN ADULT (HCC): ICD-10-CM

## 2024-06-19 DIAGNOSIS — E11.9 CONTROLLED TYPE 2 DIABETES MELLITUS WITHOUT COMPLICATION, WITH LONG-TERM CURRENT USE OF INSULIN (HCC): Primary | ICD-10-CM

## 2024-06-19 DIAGNOSIS — Z79.4 CONTROLLED TYPE 2 DIABETES MELLITUS WITHOUT COMPLICATION, WITH LONG-TERM CURRENT USE OF INSULIN (HCC): Primary | ICD-10-CM

## 2024-06-19 DIAGNOSIS — Z83.3 FAMILY HISTORY OF DIABETES MELLITUS (DM): ICD-10-CM

## 2024-06-19 DIAGNOSIS — I10 ESSENTIAL HYPERTENSION: ICD-10-CM

## 2024-06-19 DIAGNOSIS — R06.09 DYSPNEA ON EXERTION: ICD-10-CM

## 2024-06-19 DIAGNOSIS — D64.9 ANEMIA, UNSPECIFIED TYPE: ICD-10-CM

## 2024-06-19 DIAGNOSIS — E55.9 VITAMIN D DEFICIENCY: ICD-10-CM

## 2024-06-19 DIAGNOSIS — R07.89 OTHER CHEST PAIN: ICD-10-CM

## 2024-06-19 DIAGNOSIS — E78.2 MIXED HYPERLIPIDEMIA: ICD-10-CM

## 2024-06-19 LAB
GLUCOSE, POC: 145 MG/DL
HBA1C MFR BLD: 6.8 %

## 2024-06-19 PROCEDURE — 83036 HEMOGLOBIN GLYCOSYLATED A1C: CPT | Performed by: FAMILY MEDICINE

## 2024-06-19 PROCEDURE — 99214 OFFICE O/P EST MOD 30 MIN: CPT | Performed by: FAMILY MEDICINE

## 2024-06-19 PROCEDURE — 82962 GLUCOSE BLOOD TEST: CPT | Performed by: FAMILY MEDICINE

## 2024-06-19 PROCEDURE — 3074F SYST BP LT 130 MM HG: CPT | Performed by: FAMILY MEDICINE

## 2024-06-19 PROCEDURE — 3079F DIAST BP 80-89 MM HG: CPT | Performed by: FAMILY MEDICINE

## 2024-06-19 RX ORDER — LOSARTAN POTASSIUM AND HYDROCHLOROTHIAZIDE 12.5; 1 MG/1; MG/1
1 TABLET ORAL DAILY
Qty: 90 TABLET | Refills: 1 | Status: SHIPPED | OUTPATIENT
Start: 2024-06-19

## 2024-06-19 RX ORDER — NITROGLYCERIN 0.4 MG/1
0.4 TABLET SUBLINGUAL EVERY 5 MIN PRN
Qty: 25 TABLET | Refills: 1 | Status: SHIPPED | OUTPATIENT
Start: 2024-06-19

## 2024-06-19 RX ORDER — ATORVASTATIN CALCIUM 20 MG/1
20 TABLET, FILM COATED ORAL DAILY
Qty: 30 TABLET | Refills: 3 | Status: SHIPPED | OUTPATIENT
Start: 2024-06-19

## 2024-06-19 ASSESSMENT — PATIENT HEALTH QUESTIONNAIRE - PHQ9
1. LITTLE INTEREST OR PLEASURE IN DOING THINGS: NOT AT ALL
SUM OF ALL RESPONSES TO PHQ QUESTIONS 1-9: 0
SUM OF ALL RESPONSES TO PHQ9 QUESTIONS 1 & 2: 0
SUM OF ALL RESPONSES TO PHQ QUESTIONS 1-9: 0
SUM OF ALL RESPONSES TO PHQ QUESTIONS 1-9: 0
2. FEELING DOWN, DEPRESSED OR HOPELESS: NOT AT ALL
SUM OF ALL RESPONSES TO PHQ QUESTIONS 1-9: 0

## 2024-06-19 NOTE — PROGRESS NOTES
Chief Complaint   Patient presents with    Follow-up Chronic Condition    Hypertension    Diabetes     Pt here for follow up hypertension and diabetes, pt reports feeling lightheaded, nauseous, and fatigue x 3 weeks, went to [facility] ER       /88 (Site: Left Upper Arm, Position: Sitting, Cuff Size: Large Adult)   Pulse 77   Temp 98.3 °F (36.8 °C) (Oral)   Resp 16   Ht 1.702 m (5' 7\")   Wt 104.4 kg (230 lb 3.2 oz)   SpO2 97%   BMI 36.05 kg/m²      \"Have you been to the ER, urgent care clinic since your last visit?  Hospitalized since your last visit?\"    YES - When: approximately 2  weeks ago.  Where and Why: approx 2 weeks ago, [facility] for weakness and SOB.    “Have you seen or consulted any other health care providers outside of [facility] since your last visit?”    NO        “Have you had a colorectal cancer screening such as a colonoscopy/FIT/Cologuard?    NO    No colonoscopy on file  No cologuard on file  No FIT/FOBT on file   No flexible sigmoidoscopy on file         Click Here for Release of Records Request

## 2024-06-19 NOTE — PROGRESS NOTES
Mark Tompkins is a 53 y.o. female and presents with Follow-up Chronic Condition, Hypertension, and Diabetes (Pt here for follow up hypertension and diabetes, pt reports feeling lightheaded, nauseous, and fatigue x 3 weeks, went to [facility] ER)  .  Hypertension    Diabetes     with a hx of HTN and Diabetes  53 y.o. Patient here on a routine follow up with  recent symptoms of ongoing dizziness,dyspnea and weakness with chest pain in pt s/p haltor monitor 2 days ago. Symptoms started 2 weeks ago with ER visit    Subjective:  Cardiovascular Review:  The patient has hypertension   Diet and Lifestyle: generally follows a low fat low cholesterol diet, generally follows a low sodium diet, exercises sporadically  Home BP Monitoring: is not measured at home.  Pertinent ROS: taking medications as instructed, no medication side effects noted, no TIA's, no chest pain on exertion, no dyspnea on exertion, no swelling of ankles.     Review of Systems  Review of Systems - Respiratory ROS: positive for - shortness of breath  Cardiovascular ROS: positive for - chest pain, dyspnea on exertion, palpitations, and shortness of breath       Past Medical History:   Diagnosis Date    Anemia     Back pain     Congestive heart failure (CHF) (HCC)     Depression     GERD (gastroesophageal reflux disease)     High cholesterol     Hypertension     Plantar fasciitis     Strain of hamstring     Type 2 diabetes mellitus (HCC)     Vitamin D deficiency      Past Surgical History:   Procedure Laterality Date    HYSTERECTOMY (CERVIX STATUS UNKNOWN)      TUBAL LIGATION       Social History     Socioeconomic History    Marital status: Single     Spouse name: None    Number of children: None    Years of education: None    Highest education level: None   Tobacco Use    Smoking status: Never    Smokeless tobacco: Never   Substance and Sexual Activity    Alcohol use: Not Currently    Drug use: Not Currently    Sexual activity: Yes     Birth

## 2024-06-27 ENCOUNTER — TRANSCRIBE ORDERS (OUTPATIENT)
Facility: HOSPITAL | Age: 54
End: 2024-06-27

## 2024-06-27 DIAGNOSIS — R07.2 PRECORDIAL PAIN: Primary | ICD-10-CM

## 2024-07-11 RX ORDER — OMEPRAZOLE 40 MG/1
CAPSULE, DELAYED RELEASE ORAL DAILY
Qty: 90 CAPSULE | Refills: 1 | Status: SHIPPED | OUTPATIENT
Start: 2024-07-11

## 2024-08-13 RX ORDER — AMLODIPINE BESYLATE 5 MG/1
5 TABLET ORAL DAILY
Qty: 30 TABLET | Refills: 2 | Status: SHIPPED | OUTPATIENT
Start: 2024-08-13

## 2024-08-19 ENCOUNTER — APPOINTMENT (OUTPATIENT)
Facility: HOSPITAL | Age: 54
End: 2024-08-19
Payer: COMMERCIAL

## 2024-08-19 ENCOUNTER — HOSPITAL ENCOUNTER (EMERGENCY)
Facility: HOSPITAL | Age: 54
Discharge: HOME | End: 2024-08-19
Attending: EMERGENCY MEDICINE
Payer: COMMERCIAL

## 2024-08-19 VITALS
BODY MASS INDEX: 36.05 KG/M2 | TEMPERATURE: 98.3 F | DIASTOLIC BLOOD PRESSURE: 99 MMHG | SYSTOLIC BLOOD PRESSURE: 148 MMHG | HEIGHT: 67 IN | OXYGEN SATURATION: 97 % | RESPIRATION RATE: 16 BRPM | HEART RATE: 69 BPM

## 2024-08-19 DIAGNOSIS — R07.9 CHEST PAIN, UNSPECIFIED TYPE: ICD-10-CM

## 2024-08-19 DIAGNOSIS — R11.0 NAUSEA: Primary | ICD-10-CM

## 2024-08-19 LAB
ALBUMIN SERPL-MCNC: 4.4 G/DL (ref 3.5–5.2)
ALBUMIN/GLOB SERPL: 1.2 (ref 1.1–2.2)
ALP SERPL-CCNC: 91 U/L (ref 35–104)
ALT SERPL-CCNC: 13 U/L (ref 10–35)
ANION GAP SERPL CALC-SCNC: 13 MMOL/L (ref 5–15)
AST SERPL-CCNC: 28 U/L (ref 10–35)
BASOPHILS # BLD: 0 K/UL (ref 0–1)
BASOPHILS NFR BLD: 0 % (ref 0–1)
BILIRUB SERPL-MCNC: 0.3 MG/DL (ref 0.2–1)
BUN SERPL-MCNC: 15 MG/DL (ref 6–20)
BUN/CREAT SERPL: 17 (ref 12–20)
CALCIUM SERPL-MCNC: 9.6 MG/DL (ref 8.6–10)
CHLORIDE SERPL-SCNC: 99 MMOL/L (ref 98–107)
CO2 SERPL-SCNC: 27 MMOL/L (ref 22–29)
CREAT SERPL-MCNC: 0.89 MG/DL (ref 0.5–0.9)
DIFFERENTIAL METHOD BLD: ABNORMAL
EOSINOPHIL # BLD: 0.1 K/UL (ref 0–0.4)
EOSINOPHIL NFR BLD: 2 %
ERYTHROCYTE [DISTWIDTH] IN BLOOD BY AUTOMATED COUNT: 12.7 % (ref 11.5–14.5)
GLOBULIN SER CALC-MCNC: 3.6 G/DL (ref 2–4)
GLUCOSE SERPL-MCNC: 142 MG/DL (ref 65–100)
HCT VFR BLD AUTO: 40.5 % (ref 35–47)
HGB BLD-MCNC: 13.2 G/DL (ref 11.5–16)
IMM GRANULOCYTES # BLD AUTO: 0 K/UL (ref 0–0.04)
IMM GRANULOCYTES NFR BLD AUTO: 0 % (ref 0–0.5)
LIPASE SERPL-CCNC: 26 U/L (ref 13–60)
LYMPHOCYTES # BLD: 2.8 K/UL (ref 0.8–3.5)
LYMPHOCYTES NFR BLD: 43 % (ref 12–49)
MCH RBC QN AUTO: 27.3 PG (ref 26–34)
MCHC RBC AUTO-ENTMCNC: 32.6 G/DL (ref 30–36.5)
MCV RBC AUTO: 83.9 FL (ref 80–99)
MONOCYTES # BLD: 0.5 K/UL (ref 0–1)
MONOCYTES NFR BLD: 8 % (ref 5–13)
NEUTS SEG # BLD: 3 K/UL (ref 1.8–8)
NEUTS SEG NFR BLD: 47 % (ref 32–75)
NRBC # BLD: 0 K/UL (ref 0–0.01)
NRBC BLD-RTO: 0 PER 100 WBC
NT PRO BNP: <36 PG/ML
PLATELET # BLD AUTO: 256 K/UL (ref 150–400)
PMV BLD AUTO: 10.5 FL (ref 8.9–12.9)
POTASSIUM SERPL-SCNC: 3.6 MMOL/L (ref 3.5–5.1)
PROT SERPL-MCNC: 8 G/DL (ref 6.4–8.3)
RBC # BLD AUTO: 4.83 M/UL (ref 3.8–5.2)
SODIUM SERPL-SCNC: 139 MMOL/L (ref 136–145)
TROPONIN T SERPL HS-MCNC: <6 NG/L (ref 0–14)
WBC # BLD AUTO: 6.5 K/UL (ref 3.6–11)

## 2024-08-19 PROCEDURE — 96374 THER/PROPH/DIAG INJ IV PUSH: CPT

## 2024-08-19 PROCEDURE — 36415 COLL VENOUS BLD VENIPUNCTURE: CPT

## 2024-08-19 PROCEDURE — 85025 COMPLETE CBC W/AUTO DIFF WBC: CPT

## 2024-08-19 PROCEDURE — 6360000002 HC RX W HCPCS: Performed by: EMERGENCY MEDICINE

## 2024-08-19 PROCEDURE — 84484 ASSAY OF TROPONIN QUANT: CPT

## 2024-08-19 PROCEDURE — 71046 X-RAY EXAM CHEST 2 VIEWS: CPT

## 2024-08-19 PROCEDURE — 99285 EMERGENCY DEPT VISIT HI MDM: CPT

## 2024-08-19 PROCEDURE — 83690 ASSAY OF LIPASE: CPT

## 2024-08-19 PROCEDURE — 83880 ASSAY OF NATRIURETIC PEPTIDE: CPT

## 2024-08-19 PROCEDURE — 80053 COMPREHEN METABOLIC PANEL: CPT

## 2024-08-19 PROCEDURE — 96375 TX/PRO/DX INJ NEW DRUG ADDON: CPT

## 2024-08-19 PROCEDURE — 93005 ELECTROCARDIOGRAM TRACING: CPT | Performed by: EMERGENCY MEDICINE

## 2024-08-19 RX ORDER — DIPHENHYDRAMINE HYDROCHLORIDE 50 MG/ML
25 INJECTION INTRAMUSCULAR; INTRAVENOUS ONCE
OUTPATIENT
Start: 2024-08-19 | End: 2024-08-19

## 2024-08-19 RX ORDER — ONDANSETRON 2 MG/ML
4 INJECTION INTRAMUSCULAR; INTRAVENOUS ONCE
Status: COMPLETED | OUTPATIENT
Start: 2024-08-19 | End: 2024-08-19

## 2024-08-19 RX ORDER — PROCHLORPERAZINE EDISYLATE 5 MG/ML
5 INJECTION INTRAMUSCULAR; INTRAVENOUS ONCE
Status: COMPLETED | OUTPATIENT
Start: 2024-08-19 | End: 2024-08-19

## 2024-08-19 RX ORDER — PROMETHAZINE HYDROCHLORIDE 25 MG/1
25 TABLET ORAL EVERY 6 HOURS PRN
Qty: 20 TABLET | Refills: 0 | Status: SHIPPED | OUTPATIENT
Start: 2024-08-19 | End: 2024-08-24

## 2024-08-19 RX ORDER — DIPHENHYDRAMINE HYDROCHLORIDE 50 MG/ML
25 INJECTION INTRAMUSCULAR; INTRAVENOUS
Status: COMPLETED | OUTPATIENT
Start: 2024-08-19 | End: 2024-08-19

## 2024-08-19 RX ADMIN — DIPHENHYDRAMINE HYDROCHLORIDE 25 MG: 50 INJECTION INTRAMUSCULAR; INTRAVENOUS at 20:17

## 2024-08-19 RX ADMIN — PROCHLORPERAZINE EDISYLATE 5 MG: 5 INJECTION INTRAMUSCULAR; INTRAVENOUS at 20:17

## 2024-08-19 RX ADMIN — ONDANSETRON 4 MG: 2 INJECTION INTRAMUSCULAR; INTRAVENOUS at 20:17

## 2024-08-19 ASSESSMENT — ENCOUNTER SYMPTOMS
SORE THROAT: 0
NAUSEA: 1
EYE PAIN: 0
ABDOMINAL PAIN: 0
VOMITING: 1
CHEST TIGHTNESS: 0
BACK PAIN: 0
SHORTNESS OF BREATH: 0

## 2024-08-19 ASSESSMENT — PAIN - FUNCTIONAL ASSESSMENT: PAIN_FUNCTIONAL_ASSESSMENT: 0-10

## 2024-08-19 ASSESSMENT — PAIN SCALES - GENERAL: PAINLEVEL_OUTOF10: 6

## 2024-08-19 ASSESSMENT — PAIN DESCRIPTION - LOCATION: LOCATION: CHEST

## 2024-08-19 ASSESSMENT — PAIN DESCRIPTION - ORIENTATION: ORIENTATION: LEFT

## 2024-08-19 NOTE — ED TRIAGE NOTES
Pt c/o chest pain and nausea that started yesterday morning. She stated she experienced sob and sharp pain in her left shoulder. She stated her MD prescribed her nitro prn a month ago. PMH of HTN. She states she did not take her bp meds today.

## 2024-08-20 NOTE — ED PROVIDER NOTES
[facility]  EMERGENCY DEPARTMENT ENCOUNTER      Pt Name: Mark Tompkins  MRN: [id number]  Birthdate 1970  Date of evaluation: 8/19/2024  Provider: Rahul Angeles MD      HISTORY OF PRESENT ILLNESS      53-year-old -American female with history of CHF presenting to the ER for nausea.  Patient reports she has had severe nausea since yesterday.  Several episodes of vomiting.  Reports yesterday she had some sharp chest pain yesterday.  Denies fever or chills.              Nursing Notes were reviewed.    REVIEW OF SYSTEMS         Review of Systems   Constitutional:  Negative for chills and fever.   HENT:  Negative for congestion and sore throat.    Eyes:  Negative for pain and visual disturbance.   Respiratory:  Negative for chest tightness and shortness of breath.    Cardiovascular:  Positive for chest pain. Negative for leg swelling.   Gastrointestinal:  Positive for nausea and vomiting. Negative for abdominal pain.   Genitourinary:  Negative for dysuria.   Musculoskeletal:  Negative for back pain.   Skin:  Negative for rash.   Neurological:  Negative for weakness and headaches.   All other systems reviewed and are negative.          PAST MEDICAL HISTORY     Past Medical History:   Diagnosis Date    Anemia     Back pain     Congestive heart failure (CHF) (HCC)     Depression     GERD (gastroesophageal reflux disease)     High cholesterol     Hypertension     Plantar fasciitis     Strain of hamstring     Type 2 diabetes mellitus (HCC)     Vitamin D deficiency          SURGICAL HISTORY       Past Surgical History:   Procedure Laterality Date    HYSTERECTOMY (CERVIX STATUS UNKNOWN)      TUBAL LIGATION           CURRENT MEDICATIONS       Previous Medications    ALBUTEROL SULFATE HFA (PROVENTIL;VENTOLIN;PROAIR) 108 (90 BASE) MCG/ACT INHALER    INHALE 2 PUFFS EVERY 4-6 HRS AS NEEDED . 10-15 MINUTES BEFORE EXERCISE.    AMLODIPINE (NORVASC) 5 MG TABLET    TAKE 1 TABLET BY MOUTH EVERY DAY    ASPIRIN

## 2024-08-21 LAB
EKG ATRIAL RATE: 74 BPM
EKG DIAGNOSIS: NORMAL
EKG P AXIS: 42 DEGREES
EKG P-R INTERVAL: 164 MS
EKG Q-T INTERVAL: 380 MS
EKG QRS DURATION: 70 MS
EKG QTC CALCULATION (BAZETT): 421 MS
EKG R AXIS: 1 DEGREES
EKG T AXIS: 27 DEGREES
EKG VENTRICULAR RATE: 74 BPM

## 2024-08-21 PROCEDURE — 93010 ELECTROCARDIOGRAM REPORT: CPT | Performed by: SPECIALIST

## 2024-08-22 ENCOUNTER — OFFICE VISIT (OUTPATIENT)
Facility: CLINIC | Age: 54
End: 2024-08-22
Payer: COMMERCIAL

## 2024-08-22 VITALS
DIASTOLIC BLOOD PRESSURE: 84 MMHG | WEIGHT: 232 LBS | HEART RATE: 85 BPM | OXYGEN SATURATION: 97 % | RESPIRATION RATE: 18 BRPM | SYSTOLIC BLOOD PRESSURE: 131 MMHG | BODY MASS INDEX: 36.41 KG/M2 | TEMPERATURE: 98.3 F | HEIGHT: 67 IN

## 2024-08-22 DIAGNOSIS — Z09 HOSPITAL DISCHARGE FOLLOW-UP: Primary | ICD-10-CM

## 2024-08-22 DIAGNOSIS — E78.2 MIXED HYPERLIPIDEMIA: ICD-10-CM

## 2024-08-22 DIAGNOSIS — E11.9 CONTROLLED TYPE 2 DIABETES MELLITUS WITHOUT COMPLICATION, WITH LONG-TERM CURRENT USE OF INSULIN (HCC): ICD-10-CM

## 2024-08-22 DIAGNOSIS — I10 ESSENTIAL HYPERTENSION: ICD-10-CM

## 2024-08-22 DIAGNOSIS — K21.9 GASTROESOPHAGEAL REFLUX DISEASE WITHOUT ESOPHAGITIS: ICD-10-CM

## 2024-08-22 DIAGNOSIS — M54.31 BILATERAL SCIATICA: ICD-10-CM

## 2024-08-22 DIAGNOSIS — M54.32 BILATERAL SCIATICA: ICD-10-CM

## 2024-08-22 DIAGNOSIS — Z79.4 CONTROLLED TYPE 2 DIABETES MELLITUS WITHOUT COMPLICATION, WITH LONG-TERM CURRENT USE OF INSULIN (HCC): ICD-10-CM

## 2024-08-22 PROCEDURE — 99214 OFFICE O/P EST MOD 30 MIN: CPT | Performed by: FAMILY MEDICINE

## 2024-08-22 PROCEDURE — 3075F SYST BP GE 130 - 139MM HG: CPT | Performed by: FAMILY MEDICINE

## 2024-08-22 PROCEDURE — 3079F DIAST BP 80-89 MM HG: CPT | Performed by: FAMILY MEDICINE

## 2024-08-22 PROCEDURE — 1111F DSCHRG MED/CURRENT MED MERGE: CPT | Performed by: FAMILY MEDICINE

## 2024-08-22 RX ORDER — ACETAMINOPHEN 500 MG
500 TABLET ORAL 4 TIMES DAILY PRN
Qty: 120 TABLET | Refills: 5 | Status: SHIPPED | OUTPATIENT
Start: 2024-08-22

## 2024-08-22 RX ORDER — CYCLOBENZAPRINE HCL 5 MG
5 TABLET ORAL 2 TIMES DAILY PRN
Qty: 20 TABLET | Refills: 0 | Status: SHIPPED | OUTPATIENT
Start: 2024-08-22 | End: 2024-09-01

## 2024-08-22 SDOH — ECONOMIC STABILITY: FOOD INSECURITY: WITHIN THE PAST 12 MONTHS, YOU WORRIED THAT YOUR FOOD WOULD RUN OUT BEFORE YOU GOT MONEY TO BUY MORE.: NEVER TRUE

## 2024-08-22 SDOH — ECONOMIC STABILITY: FOOD INSECURITY: WITHIN THE PAST 12 MONTHS, THE FOOD YOU BOUGHT JUST DIDN'T LAST AND YOU DIDN'T HAVE MONEY TO GET MORE.: NEVER TRUE

## 2024-08-22 SDOH — ECONOMIC STABILITY: INCOME INSECURITY: HOW HARD IS IT FOR YOU TO PAY FOR THE VERY BASICS LIKE FOOD, HOUSING, MEDICAL CARE, AND HEATING?: NOT HARD AT ALL

## 2024-08-22 NOTE — PROGRESS NOTES
Chief Complaint   Patient presents with    Follow-Up from Hospital     Pt here for a follow up from a ER hospital visit for 8/19/24     /84 (Site: Right Upper Arm, Position: Sitting, Cuff Size: Large Adult)   Pulse 85   Temp 98.3 °F (36.8 °C) (Oral)   Resp 18   Ht 1.702 m (5' 7\")   Wt 105.2 kg (232 lb)   SpO2 97%   BMI 36.34 kg/m²       \"Have you been to the ER, urgent care clinic since your last visit?  Hospitalized since your last visit?\"    YES - When: approximately 3 days ago.  Where and Why: 8/19/24 [facility] for nausea and chest pain .    “Have you seen or consulted any other health care providers outside of [facility] since your last visit?”    YES , 8/22/24 at [facility] in [place], [place]         “Have you had a colorectal cancer screening such as a colonoscopy/FIT/Cologuard?    NO    No colonoscopy on file  No cologuard on file  No FIT/FOBT on file   No flexible sigmoidoscopy on file         Click Here for Release of Records Request
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
## 2024-08-26 ENCOUNTER — HOSPITAL ENCOUNTER (OUTPATIENT)
Facility: HOSPITAL | Age: 54
Discharge: HOME | End: 2024-08-29
Attending: INTERNAL MEDICINE
Payer: COMMERCIAL

## 2024-08-26 ENCOUNTER — HOSPITAL ENCOUNTER (OUTPATIENT)
Facility: HOSPITAL | Age: 54
Discharge: HOME | End: 2024-08-28
Attending: INTERNAL MEDICINE
Payer: COMMERCIAL

## 2024-08-26 VITALS
WEIGHT: 232 LBS | HEIGHT: 67 IN | BODY MASS INDEX: 36.41 KG/M2 | SYSTOLIC BLOOD PRESSURE: 159 MMHG | DIASTOLIC BLOOD PRESSURE: 96 MMHG | HEART RATE: 74 BPM

## 2024-08-26 DIAGNOSIS — R07.2 PRECORDIAL PAIN: ICD-10-CM

## 2024-08-26 LAB
ECHO BSA: 2.23 M2
STRESS BASELINE DIAS BP: 96 MMHG
STRESS BASELINE HR: 74 BPM
STRESS BASELINE SYS BP: 159 MMHG
STRESS STAGE 1 BP: NORMAL MMHG
STRESS STAGE 1 DURATION: 1 MIN:SEC
STRESS STAGE 1 HR: 102 BPM
STRESS STAGE 2 DURATION: 2 MIN:SEC
STRESS STAGE 2 HR: 100 BPM
STRESS STAGE 3 BP: NORMAL MMHG
STRESS STAGE 3 DURATION: 3 MIN:SEC
STRESS STAGE 3 HR: 96 BPM
STRESS STAGE 4 BP: NORMAL MMHG
STRESS STAGE 4 DURATION: 4 MIN:SEC
STRESS STAGE 4 HR: 93 BPM
STRESS TARGET HR: 168 BPM

## 2024-08-26 PROCEDURE — A9500 TC99M SESTAMIBI: HCPCS | Performed by: INTERNAL MEDICINE

## 2024-08-26 PROCEDURE — 3430000000 HC RX DIAGNOSTIC RADIOPHARMACEUTICAL: Performed by: INTERNAL MEDICINE

## 2024-08-26 PROCEDURE — 93017 CV STRESS TEST TRACING ONLY: CPT

## 2024-08-26 PROCEDURE — 6360000002 HC RX W HCPCS

## 2024-08-26 RX ORDER — TETRAKIS(2-METHOXYISOBUTYLISOCYANIDE)COPPER(I) TETRAFLUOROBORATE 1 MG/ML
31.5 INJECTION, POWDER, LYOPHILIZED, FOR SOLUTION INTRAVENOUS
Status: COMPLETED | OUTPATIENT
Start: 2024-08-26 | End: 2024-08-26

## 2024-08-26 RX ORDER — REGADENOSON 0.08 MG/ML
INJECTION, SOLUTION INTRAVENOUS
Status: COMPLETED
Start: 2024-08-26 | End: 2024-08-26

## 2024-08-26 RX ORDER — TETRAKIS(2-METHOXYISOBUTYLISOCYANIDE)COPPER(I) TETRAFLUOROBORATE 1 MG/ML
10.18 INJECTION, POWDER, LYOPHILIZED, FOR SOLUTION INTRAVENOUS
Status: COMPLETED | OUTPATIENT
Start: 2024-08-26 | End: 2024-08-26

## 2024-08-26 RX ADMIN — TETRAKIS(2-METHOXYISOBUTYLISOCYANIDE)COPPER(I) TETRAFLUOROBORATE 10.18 MILLICURIE: 1 INJECTION, POWDER, LYOPHILIZED, FOR SOLUTION INTRAVENOUS at 10:00

## 2024-08-26 RX ADMIN — REGADENOSON 0.4 MG: 0.08 INJECTION, SOLUTION INTRAVENOUS at 11:30

## 2024-08-26 RX ADMIN — TETRAKIS(2-METHOXYISOBUTYLISOCYANIDE)COPPER(I) TETRAFLUOROBORATE 31.5 MILLICURIE: 1 INJECTION, POWDER, LYOPHILIZED, FOR SOLUTION INTRAVENOUS at 11:30

## 2024-09-07 LAB
CHOLEST SERPL-MCNC: 212 MG/DL (ref 100–199)
HBA1C MFR BLD: 7.3 % (ref 4.8–5.6)
HDLC SERPL-MCNC: 69 MG/DL
LDLC SERPL CALC-MCNC: 127 MG/DL (ref 0–99)
TRIGL SERPL-MCNC: 88 MG/DL (ref 0–149)
VLDLC SERPL CALC-MCNC: 16 MG/DL (ref 5–40)

## 2024-09-16 RX ORDER — ATORVASTATIN CALCIUM 20 MG/1
20 TABLET, FILM COATED ORAL DAILY
Qty: 90 TABLET | Refills: 0 | Status: SHIPPED | OUTPATIENT
Start: 2024-09-16

## 2024-09-19 ENCOUNTER — TELEPHONE (OUTPATIENT)
Facility: CLINIC | Age: 54
End: 2024-09-19

## 2024-09-19 ENCOUNTER — OFFICE VISIT (OUTPATIENT)
Facility: CLINIC | Age: 54
End: 2024-09-19

## 2024-09-19 VITALS
OXYGEN SATURATION: 99 % | WEIGHT: 223.4 LBS | TEMPERATURE: 97.9 F | DIASTOLIC BLOOD PRESSURE: 81 MMHG | HEIGHT: 67 IN | BODY MASS INDEX: 35.06 KG/M2 | HEART RATE: 84 BPM | SYSTOLIC BLOOD PRESSURE: 140 MMHG | RESPIRATION RATE: 18 BRPM

## 2024-09-19 DIAGNOSIS — R51.9 ACUTE NONINTRACTABLE HEADACHE, UNSPECIFIED HEADACHE TYPE: ICD-10-CM

## 2024-09-19 DIAGNOSIS — E78.2 MIXED HYPERLIPIDEMIA: ICD-10-CM

## 2024-09-19 DIAGNOSIS — D64.9 ANEMIA, UNSPECIFIED TYPE: ICD-10-CM

## 2024-09-19 DIAGNOSIS — J30.89 NON-SEASONAL ALLERGIC RHINITIS DUE TO OTHER ALLERGIC TRIGGER: ICD-10-CM

## 2024-09-19 DIAGNOSIS — R50.9 FEBRILE ILLNESS: ICD-10-CM

## 2024-09-19 DIAGNOSIS — K21.9 GASTROESOPHAGEAL REFLUX DISEASE WITHOUT ESOPHAGITIS: ICD-10-CM

## 2024-09-19 DIAGNOSIS — Z79.4 CONTROLLED TYPE 2 DIABETES MELLITUS WITHOUT COMPLICATION, WITH LONG-TERM CURRENT USE OF INSULIN (HCC): Primary | ICD-10-CM

## 2024-09-19 DIAGNOSIS — E55.9 VITAMIN D DEFICIENCY: ICD-10-CM

## 2024-09-19 DIAGNOSIS — Z83.3 FAMILY HISTORY OF DIABETES MELLITUS (DM): ICD-10-CM

## 2024-09-19 DIAGNOSIS — B34.9 VIRAL SYNDROME: ICD-10-CM

## 2024-09-19 DIAGNOSIS — E11.9 CONTROLLED TYPE 2 DIABETES MELLITUS WITHOUT COMPLICATION, WITH LONG-TERM CURRENT USE OF INSULIN (HCC): Primary | ICD-10-CM

## 2024-09-19 LAB
GLUCOSE, POC: 175 MG/DL
INFLUENZA A ANTIGEN, POC: NEGATIVE
INFLUENZA B ANTIGEN, POC: NEGATIVE
Lab: NORMAL
QC PASS/FAIL: NORMAL
SARS-COV-2, POC: NORMAL
VALID INTERNAL CONTROL, POC: PRESENT

## 2024-09-19 RX ORDER — METFORMIN HCL 500 MG
TABLET, EXTENDED RELEASE 24 HR ORAL
Qty: 180 TABLET | Refills: 0 | Status: CANCELLED | OUTPATIENT
Start: 2024-09-19

## 2024-09-19 RX ORDER — METFORMIN HCL 500 MG
500 TABLET, EXTENDED RELEASE 24 HR ORAL 3 TIMES DAILY
Qty: 270 TABLET | Refills: 0 | Status: SHIPPED | OUTPATIENT
Start: 2024-09-19 | End: 2024-12-18

## 2024-09-20 RX ORDER — OSELTAMIVIR PHOSPHATE 75 MG/1
75 CAPSULE ORAL 2 TIMES DAILY
Qty: 10 CAPSULE | Refills: 0 | Status: SHIPPED | OUTPATIENT
Start: 2024-09-20 | End: 2024-09-25

## 2024-09-20 RX ORDER — FEXOFENADINE HCL AND PSEUDOEPHEDRINE HCI 60; 120 MG/1; MG/1
1 TABLET, EXTENDED RELEASE ORAL 2 TIMES DAILY
Qty: 20 TABLET | Refills: 0 | Status: SHIPPED | OUTPATIENT
Start: 2024-09-20 | End: 2024-09-30

## 2024-09-21 PROBLEM — Z09 HOSPITAL DISCHARGE FOLLOW-UP: Status: RESOLVED | Noted: 2024-08-22 | Resolved: 2024-09-21

## 2024-09-25 NOTE — PROGRESS NOTES
Patient wishes to have procedure on a Friday, requests call from Dr. Lynch's office to reschedule. Patient states understanding to follow up with Dr. Lynch's office if call not received.

## 2024-10-22 NOTE — PROGRESS NOTES
M for Dr. Lynch's assistant informing her of patients allergy to shellfish- anaphylaxis. Requested return call with plan of treatment.     Dorothea, from Dr. Lynch's office returned call, prescription called in for benadryl and pepcid, Dorothea notified patient to pick rx up.

## 2024-10-25 ENCOUNTER — HOSPITAL ENCOUNTER (OUTPATIENT)
Facility: HOSPITAL | Age: 54
Discharge: HOME | End: 2024-10-25
Attending: INTERNAL MEDICINE | Admitting: INTERNAL MEDICINE
Payer: COMMERCIAL

## 2024-10-25 VITALS
WEIGHT: 222 LBS | SYSTOLIC BLOOD PRESSURE: 122 MMHG | HEIGHT: 62 IN | RESPIRATION RATE: 16 BRPM | DIASTOLIC BLOOD PRESSURE: 78 MMHG | OXYGEN SATURATION: 98 % | BODY MASS INDEX: 40.85 KG/M2 | HEART RATE: 112 BPM | TEMPERATURE: 98.4 F

## 2024-10-25 DIAGNOSIS — R07.9 CHEST PAIN: ICD-10-CM

## 2024-10-25 LAB
ALBUMIN SERPL-MCNC: 3.4 G/DL (ref 3.5–5)
ALBUMIN/GLOB SERPL: 0.9 (ref 1.1–2.2)
ALP SERPL-CCNC: 83 U/L (ref 45–117)
ALT SERPL-CCNC: 13 U/L (ref 12–78)
ANION GAP SERPL CALC-SCNC: 6 MMOL/L (ref 2–12)
APTT PPP: 28.5 SEC (ref 21.2–34.1)
AST SERPL W P-5'-P-CCNC: 16 U/L (ref 15–37)
BILIRUB SERPL-MCNC: 0.3 MG/DL (ref 0.2–1)
BUN SERPL-MCNC: 10 MG/DL (ref 6–20)
BUN/CREAT SERPL: 12 (ref 12–20)
CA-I BLD-MCNC: 9.3 MG/DL (ref 8.5–10.1)
CHLORIDE SERPL-SCNC: 106 MMOL/L (ref 97–108)
CO2 SERPL-SCNC: 29 MMOL/L (ref 21–32)
CREAT SERPL-MCNC: 0.83 MG/DL (ref 0.55–1.02)
ERYTHROCYTE [DISTWIDTH] IN BLOOD BY AUTOMATED COUNT: 12.4 % (ref 11.5–14.5)
GLOBULIN SER CALC-MCNC: 3.7 G/DL (ref 2–4)
GLUCOSE BLD STRIP.AUTO-MCNC: 119 MG/DL (ref 65–100)
GLUCOSE SERPL-MCNC: 129 MG/DL (ref 65–100)
HCT VFR BLD AUTO: 38.5 % (ref 35–47)
HGB BLD-MCNC: 12.7 G/DL (ref 11.5–16)
INR PPP: 1 (ref 0.9–1.1)
MCH RBC QN AUTO: 27.5 PG (ref 26–34)
MCHC RBC AUTO-ENTMCNC: 33 G/DL (ref 30–36.5)
MCV RBC AUTO: 83.5 FL (ref 80–99)
NRBC # BLD: 0 K/UL (ref 0–0.01)
NRBC BLD-RTO: 0 PER 100 WBC
PERFORMED BY:: ABNORMAL
PLATELET # BLD AUTO: 241 K/UL (ref 150–400)
PMV BLD AUTO: 10.1 FL (ref 8.9–12.9)
POTASSIUM SERPL-SCNC: 4 MMOL/L (ref 3.5–5.1)
PROT SERPL-MCNC: 7.1 G/DL (ref 6.4–8.2)
PROTHROMBIN TIME: 13.2 SEC (ref 11.9–14.6)
RBC # BLD AUTO: 4.61 M/UL (ref 3.8–5.2)
SODIUM SERPL-SCNC: 141 MMOL/L (ref 136–145)
THERAPEUTIC RANGE: NORMAL SEC (ref 82–109)
WBC # BLD AUTO: 5.6 K/UL (ref 3.6–11)

## 2024-10-25 PROCEDURE — 80053 COMPREHEN METABOLIC PANEL: CPT

## 2024-10-25 PROCEDURE — 85730 THROMBOPLASTIN TIME PARTIAL: CPT

## 2024-10-25 PROCEDURE — C1894 INTRO/SHEATH, NON-LASER: HCPCS | Performed by: INTERNAL MEDICINE

## 2024-10-25 PROCEDURE — 85027 COMPLETE CBC AUTOMATED: CPT

## 2024-10-25 PROCEDURE — 36415 COLL VENOUS BLD VENIPUNCTURE: CPT

## 2024-10-25 PROCEDURE — 99152 MOD SED SAME PHYS/QHP 5/>YRS: CPT | Performed by: INTERNAL MEDICINE

## 2024-10-25 PROCEDURE — 6360000002 HC RX W HCPCS: Performed by: INTERNAL MEDICINE

## 2024-10-25 PROCEDURE — 2580000003 HC RX 258: Performed by: INTERNAL MEDICINE

## 2024-10-25 PROCEDURE — 2709999900 HC NON-CHARGEABLE SUPPLY: Performed by: INTERNAL MEDICINE

## 2024-10-25 PROCEDURE — C1769 GUIDE WIRE: HCPCS | Performed by: INTERNAL MEDICINE

## 2024-10-25 PROCEDURE — 7100000010 HC PHASE II RECOVERY - FIRST 15 MIN: Performed by: INTERNAL MEDICINE

## 2024-10-25 PROCEDURE — 85610 PROTHROMBIN TIME: CPT

## 2024-10-25 PROCEDURE — 7100000000 HC PACU RECOVERY - FIRST 15 MIN: Performed by: INTERNAL MEDICINE

## 2024-10-25 PROCEDURE — 6360000004 HC RX CONTRAST MEDICATION: Performed by: INTERNAL MEDICINE

## 2024-10-25 PROCEDURE — 2500000003 HC RX 250 WO HCPCS: Performed by: INTERNAL MEDICINE

## 2024-10-25 PROCEDURE — 7100000011 HC PHASE II RECOVERY - ADDTL 15 MIN: Performed by: INTERNAL MEDICINE

## 2024-10-25 PROCEDURE — 93458 L HRT ARTERY/VENTRICLE ANGIO: CPT | Performed by: INTERNAL MEDICINE

## 2024-10-25 PROCEDURE — 82962 GLUCOSE BLOOD TEST: CPT

## 2024-10-25 PROCEDURE — 7100000001 HC PACU RECOVERY - ADDTL 15 MIN: Performed by: INTERNAL MEDICINE

## 2024-10-25 RX ORDER — IOPAMIDOL 755 MG/ML
INJECTION, SOLUTION INTRAVASCULAR PRN
Status: DISCONTINUED | OUTPATIENT
Start: 2024-10-25 | End: 2024-10-25 | Stop reason: HOSPADM

## 2024-10-25 RX ORDER — FAMOTIDINE 40 MG/1
TABLET, FILM COATED ORAL
COMMUNITY
Start: 2024-10-24

## 2024-10-25 RX ORDER — MIDAZOLAM HYDROCHLORIDE 1 MG/ML
INJECTION, SOLUTION INTRAMUSCULAR; INTRAVENOUS PRN
Status: DISCONTINUED | OUTPATIENT
Start: 2024-10-25 | End: 2024-10-25 | Stop reason: HOSPADM

## 2024-10-25 RX ORDER — SODIUM CHLORIDE 9 MG/ML
INJECTION, SOLUTION INTRAVENOUS PRN
Status: DISCONTINUED | OUTPATIENT
Start: 2024-10-25 | End: 2024-10-25 | Stop reason: HOSPADM

## 2024-10-25 RX ORDER — HEPARIN SODIUM 1000 [USP'U]/ML
INJECTION, SOLUTION INTRAVENOUS; SUBCUTANEOUS PRN
Status: DISCONTINUED | OUTPATIENT
Start: 2024-10-25 | End: 2024-10-25 | Stop reason: HOSPADM

## 2024-10-25 RX ORDER — SODIUM CHLORIDE 0.9 % (FLUSH) 0.9 %
5-40 SYRINGE (ML) INJECTION PRN
Status: DISCONTINUED | OUTPATIENT
Start: 2024-10-25 | End: 2024-10-25 | Stop reason: HOSPADM

## 2024-10-25 RX ORDER — ACETAMINOPHEN 325 MG/1
650 TABLET ORAL EVERY 4 HOURS PRN
Status: DISCONTINUED | OUTPATIENT
Start: 2024-10-25 | End: 2024-10-25 | Stop reason: HOSPADM

## 2024-10-25 RX ORDER — FENTANYL CITRATE 50 UG/ML
INJECTION, SOLUTION INTRAMUSCULAR; INTRAVENOUS PRN
Status: DISCONTINUED | OUTPATIENT
Start: 2024-10-25 | End: 2024-10-25 | Stop reason: HOSPADM

## 2024-10-25 RX ORDER — HYDRALAZINE HYDROCHLORIDE 20 MG/ML
INJECTION INTRAMUSCULAR; INTRAVENOUS PRN
Status: DISCONTINUED | OUTPATIENT
Start: 2024-10-25 | End: 2024-10-25 | Stop reason: HOSPADM

## 2024-10-25 RX ORDER — NITROGLYCERIN 20 MG/100ML
INJECTION INTRAVENOUS PRN
Status: DISCONTINUED | OUTPATIENT
Start: 2024-10-25 | End: 2024-10-25 | Stop reason: HOSPADM

## 2024-10-25 RX ORDER — 0.9 % SODIUM CHLORIDE 0.9 %
500 INTRAVENOUS SOLUTION INTRAVENOUS
Status: DISCONTINUED | OUTPATIENT
Start: 2024-10-25 | End: 2024-10-25

## 2024-10-25 RX ORDER — SODIUM CHLORIDE 0.9 % (FLUSH) 0.9 %
5-40 SYRINGE (ML) INJECTION EVERY 12 HOURS SCHEDULED
Status: DISCONTINUED | OUTPATIENT
Start: 2024-10-25 | End: 2024-10-25 | Stop reason: HOSPADM

## 2024-10-25 RX ORDER — GABAPENTIN 300 MG/1
CAPSULE ORAL 2 TIMES DAILY
COMMUNITY
Start: 2024-09-20

## 2024-10-25 RX ORDER — 0.9 % SODIUM CHLORIDE 0.9 %
500 INTRAVENOUS SOLUTION INTRAVENOUS
Status: COMPLETED | OUTPATIENT
Start: 2024-10-25 | End: 2024-10-25

## 2024-10-25 RX ORDER — HEPARIN SODIUM 200 [USP'U]/100ML
INJECTION, SOLUTION INTRAVENOUS CONTINUOUS PRN
Status: COMPLETED | OUTPATIENT
Start: 2024-10-25 | End: 2024-10-25

## 2024-10-25 RX ADMIN — SODIUM CHLORIDE 500 ML: 9 INJECTION, SOLUTION INTRAVENOUS at 12:35

## 2024-10-25 RX ADMIN — SODIUM CHLORIDE 500 ML: 9 INJECTION, SOLUTION INTRAVENOUS at 13:15

## 2024-10-25 ASSESSMENT — PAIN - FUNCTIONAL ASSESSMENT
PAIN_FUNCTIONAL_ASSESSMENT: 0-10
PAIN_FUNCTIONAL_ASSESSMENT: NONE - DENIES PAIN
PAIN_FUNCTIONAL_ASSESSMENT: 0-10
PAIN_FUNCTIONAL_ASSESSMENT: 0-10
PAIN_FUNCTIONAL_ASSESSMENT: FACE, LEGS, ACTIVITY, CRY, AND CONSOLABILITY (FLACC)

## 2024-10-25 NOTE — PERIOP NOTE
Pt arrived to PACU with BP cuff on left lower wrist, readings 180s/100s, BP cuff moved from left lower wrist to left upper arm. Readings 90s/50s on left upper arm.

## 2024-10-25 NOTE — DISCHARGE INSTRUCTIONS
[facility]  Cardiac Catheterization Department  [address] [place], [place] [id number] [phone]        Coronary Angiogram: What to Expect at Home  Your Recovery  A coronary angiogram is a test to examine the large blood vessels of your heart (coronary arteries).  The doctor inserted a thin, flexible tube (catheter into a blood vessel in your groin or wrist.  Your groin or wrist may have a bruise and feel sore for a few days after the procedure.  You can do light activities around the house.  But do not do anything strenuous until your doctor says it is ok.  This may be for several days.  This care sheet gives you a general idea about how long it will take for you to recover.  But each person recovers at a different pace.  Follow the steps below to feel better as quickly as possible.    How can you care for yourself at home?    Activity  If the doctor gave you a sedative:  For 24 hours, don't do anything that requires attention to detail, such as going to work, making important decisions, or signing any legal documents.  It takes time for the medicine's effects to completely wear off.  For your safety, do not drive or operate any machinery that could be dangerous.  Wait until the medicine wears off and you can think clearly and react easily.  Do not do any strenuous exercise and do not lift, pull, or push anything heavier than 5 pounds (approximately the weight of 1 gallon of milk) until your doctor says it is ok.  This may be for several days.  You can walk around the house and do light activity, such as cooking.  If the catheter was placed in your groin and you must use stairs, just go up and down slowly in as few trips as possible for the first couple of days.  If the catheter was placed in your wrist, do not bend your wrist deeply for the first couple of days.  A soft wrist-splint may be placed on your wrist as a reminder following the procedure.  Be careful using your hand to get  squeezing, or burning.  Sweating.  Shortness of breath or difficulty breathing.  Nausea or vomiting.  Jaw pain, shoulder pain, or arm pain in one or both sides.  Feelings of fullness.  Excessive fatigue or weakness.  New onset anxiety.  New onset of upper back pain.  Dizziness or lightheadedness.  A fast or uneven pulse.  Call 911 if you have been diagnosed with angina, and you have symptoms that do not go away with rest or are not getting better within 5 minutes of taking a dose of your nitroglycerin.  After you call 911, the  may tell you to chew 1 adult-strength (325 mg) of 2 to 4 low-dose aspirin (81 mg).  Wait for ambulance.  Do not drive yourself.    Call your doctor now or seek immediate medical care if:  You are bleeding from the area where the catheter was inserted.  You have a fast-growing, painful lump at the catheter site.  You have signs of infection, such as”  Increased pain, swelling, warmth or redness at the catheter site.  Red streaks leading from the catheter site.  Pus draining from the catheter site.  A fever.  Your leg or hand is painful, looks blue, or feels cold, numb, or tingly.     Watch closely for changes in your health and be sure to contact your doctor if you have any problems.

## 2024-10-25 NOTE — PERIOP NOTE
Notified Dr. Lynch that patient's heart rate is ranging 112-116. Patient is asymptomatic. Dr. Lynch ordered orthostatic blood pressures and stated patient can discharge if she remains stable during orthostatic readings.

## 2024-10-25 NOTE — PERIOP NOTE
Discharge instructions reviewed with patient and her son. Both verbalized understanding. Patient vital signs stable and she shows no signs of distress. Peripheral IV removed without complication. Patient tolerating fluids and ambulating with no difficulty. Follow up appointment made by office staff prior to procedure.    Patient was wheeled down by staff and left in a private vehicle with her son.

## 2024-11-19 RX ORDER — ESCITALOPRAM OXALATE 20 MG/1
20 TABLET ORAL DAILY
Qty: 90 TABLET | Refills: 0 | Status: SHIPPED | OUTPATIENT
Start: 2024-11-19

## 2025-01-15 RX ORDER — AZELASTINE 1 MG/ML
SPRAY, METERED NASAL
OUTPATIENT
Start: 2025-01-15

## 2025-04-14 RX ORDER — LOSARTAN POTASSIUM AND HYDROCHLOROTHIAZIDE 12.5; 1 MG/1; MG/1
1 TABLET ORAL DAILY
Qty: 90 TABLET | Refills: 1 | OUTPATIENT
Start: 2025-04-14

## 2025-05-07 ENCOUNTER — OFFICE VISIT (OUTPATIENT)
Facility: CLINIC | Age: 55
End: 2025-05-07
Payer: COMMERCIAL

## 2025-05-07 VITALS
HEIGHT: 62 IN | RESPIRATION RATE: 16 BRPM | BODY MASS INDEX: 42.21 KG/M2 | SYSTOLIC BLOOD PRESSURE: 154 MMHG | WEIGHT: 229.4 LBS | DIASTOLIC BLOOD PRESSURE: 106 MMHG | OXYGEN SATURATION: 98 % | HEART RATE: 77 BPM | TEMPERATURE: 98.3 F

## 2025-05-07 DIAGNOSIS — G56.02 CARPAL TUNNEL SYNDROME OF LEFT WRIST: ICD-10-CM

## 2025-05-07 DIAGNOSIS — K21.9 GASTROESOPHAGEAL REFLUX DISEASE WITHOUT ESOPHAGITIS: ICD-10-CM

## 2025-05-07 DIAGNOSIS — S63.502A SPRAIN OF LEFT WRIST, INITIAL ENCOUNTER: ICD-10-CM

## 2025-05-07 DIAGNOSIS — E11.9 CONTROLLED TYPE 2 DIABETES MELLITUS WITHOUT COMPLICATION, WITH LONG-TERM CURRENT USE OF INSULIN (HCC): Primary | ICD-10-CM

## 2025-05-07 DIAGNOSIS — E78.2 MIXED HYPERLIPIDEMIA: ICD-10-CM

## 2025-05-07 DIAGNOSIS — I10 ELEVATED BLOOD PRESSURE READING IN OFFICE WITH DIAGNOSIS OF HYPERTENSION: ICD-10-CM

## 2025-05-07 DIAGNOSIS — Z79.4 CONTROLLED TYPE 2 DIABETES MELLITUS WITHOUT COMPLICATION, WITH LONG-TERM CURRENT USE OF INSULIN (HCC): Primary | ICD-10-CM

## 2025-05-07 DIAGNOSIS — E55.9 VITAMIN D DEFICIENCY: ICD-10-CM

## 2025-05-07 DIAGNOSIS — I11.9 MALIGNANT HYPERTENSIVE HEART DISEASE WITHOUT HEART FAILURE: ICD-10-CM

## 2025-05-07 LAB
GLUCOSE, POC: 132 MG/DL
HBA1C MFR BLD: 6.5 %

## 2025-05-07 PROCEDURE — 3044F HG A1C LEVEL LT 7.0%: CPT | Performed by: FAMILY MEDICINE

## 2025-05-07 PROCEDURE — 3077F SYST BP >= 140 MM HG: CPT | Performed by: FAMILY MEDICINE

## 2025-05-07 PROCEDURE — 99214 OFFICE O/P EST MOD 30 MIN: CPT | Performed by: FAMILY MEDICINE

## 2025-05-07 PROCEDURE — 83036 HEMOGLOBIN GLYCOSYLATED A1C: CPT | Performed by: FAMILY MEDICINE

## 2025-05-07 PROCEDURE — 3080F DIAST BP >= 90 MM HG: CPT | Performed by: FAMILY MEDICINE

## 2025-05-07 PROCEDURE — 82962 GLUCOSE BLOOD TEST: CPT | Performed by: FAMILY MEDICINE

## 2025-05-07 RX ORDER — AMLODIPINE BESYLATE 5 MG/1
5 TABLET ORAL DAILY
Qty: 90 TABLET | Refills: 0 | Status: CANCELLED | OUTPATIENT
Start: 2025-05-07

## 2025-05-07 RX ORDER — ATORVASTATIN CALCIUM 20 MG/1
20 TABLET, FILM COATED ORAL DAILY
Qty: 90 TABLET | Refills: 0 | Status: SHIPPED | OUTPATIENT
Start: 2025-05-07

## 2025-05-07 RX ORDER — OMEPRAZOLE 40 MG/1
40 CAPSULE, DELAYED RELEASE ORAL DAILY
Qty: 90 CAPSULE | Refills: 0 | Status: SHIPPED | OUTPATIENT
Start: 2025-05-07

## 2025-05-07 RX ORDER — LOSARTAN POTASSIUM AND HYDROCHLOROTHIAZIDE 12.5; 1 MG/1; MG/1
1 TABLET ORAL DAILY
Qty: 90 TABLET | Refills: 0 | Status: SHIPPED | OUTPATIENT
Start: 2025-05-07

## 2025-05-07 RX ORDER — AMLODIPINE BESYLATE 10 MG/1
10 TABLET ORAL DAILY
Qty: 90 TABLET | Refills: 0 | Status: SHIPPED | OUTPATIENT
Start: 2025-05-07

## 2025-05-07 RX ORDER — NAPROXEN 500 MG/1
500 TABLET ORAL 2 TIMES DAILY PRN
Qty: 25 TABLET | Refills: 0 | Status: SHIPPED | OUTPATIENT
Start: 2025-05-07 | End: 2025-06-01

## 2025-05-07 RX ORDER — TOPIRAMATE 100 MG/1
TABLET, FILM COATED ORAL
COMMUNITY

## 2025-05-07 RX ORDER — METFORMIN HYDROCHLORIDE 500 MG/1
500 TABLET, EXTENDED RELEASE ORAL 3 TIMES DAILY
Qty: 270 TABLET | Refills: 0 | Status: SHIPPED | OUTPATIENT
Start: 2025-05-07 | End: 2025-08-05

## 2025-05-07 RX ORDER — FERROUS SULFATE 325(65) MG
325 TABLET ORAL
Qty: 90 TABLET | Refills: 0 | Status: SHIPPED | OUTPATIENT
Start: 2025-05-07

## 2025-05-07 RX ORDER — PREGABALIN 75 MG/1
75 CAPSULE ORAL 2 TIMES DAILY
COMMUNITY
Start: 2024-12-16

## 2025-05-07 RX ORDER — ESCITALOPRAM OXALATE 20 MG/1
20 TABLET ORAL DAILY
Qty: 90 TABLET | Refills: 0 | Status: SHIPPED | OUTPATIENT
Start: 2025-05-07

## 2025-05-07 SDOH — ECONOMIC STABILITY: FOOD INSECURITY: WITHIN THE PAST 12 MONTHS, THE FOOD YOU BOUGHT JUST DIDN'T LAST AND YOU DIDN'T HAVE MONEY TO GET MORE.: NEVER TRUE

## 2025-05-07 SDOH — ECONOMIC STABILITY: FOOD INSECURITY: WITHIN THE PAST 12 MONTHS, YOU WORRIED THAT YOUR FOOD WOULD RUN OUT BEFORE YOU GOT MONEY TO BUY MORE.: NEVER TRUE

## 2025-05-07 ASSESSMENT — PATIENT HEALTH QUESTIONNAIRE - PHQ9
SUM OF ALL RESPONSES TO PHQ QUESTIONS 1-9: 0
1. LITTLE INTEREST OR PLEASURE IN DOING THINGS: NOT AT ALL
SUM OF ALL RESPONSES TO PHQ QUESTIONS 1-9: 0
2. FEELING DOWN, DEPRESSED OR HOPELESS: NOT AT ALL

## 2025-05-07 NOTE — PROGRESS NOTES
Mark Tompkins is a 54 y.o. female and presents with 3 Month Follow-Up (Pt here for her 3 month follow up visit for diabetes and HTN, need med refills, reports pain and swelling to left wrist that travels up her arm), Diabetes, and Hypertension  .  Diabetes    Hypertension     with a hx of HTN and Diabetes  54 y.o. 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  Pertinent ROS: taking medications as instructed, no medication side effects noted, no TIA's, no chest pain on exertion, no dyspnea on exertion, no swelling of ankles.     Review of Systems  Review of Systems - Musculoskeletal ROS: positive for - pain in left wrist       Past Medical History:   Diagnosis Date    Anemia     Back pain     Congestive heart failure (CHF) (HCC)     Depression     GERD (gastroesophageal reflux disease)     High cholesterol     Hx of blood clots     Hypertension     Plantar fasciitis     SOB (shortness of breath)     Type 2 diabetes mellitus (HCC)     Vitamin D deficiency      Past Surgical History:   Procedure Laterality Date    CARDIAC PROCEDURE N/A 10/25/2024    Left heart cath / coronary angiography performed by Juan M Lynch MD at [facility] CARDIAC CATH LAB    HYSTERECTOMY (CERVIX STATUS UNKNOWN)      TUBAL LIGATION       Social History     Socioeconomic History    Marital status: Single     Spouse name: None    Number of children: None    Years of education: None    Highest education level: None   Tobacco Use    Smoking status: Never    Smokeless tobacco: Never   Vaping Use    Vaping status: Never Used   Substance and Sexual Activity

## 2025-05-07 NOTE — PROGRESS NOTES
Chief Complaint   Patient presents with    3 Month Follow-Up     Pt here for her 3 month follow up visit for diabetes and HTN, need med refills, reports pain and swelling to left wrist that travels up her arm    Diabetes    Hypertension     BP (!) 154/106 (BP Site: Left Upper Arm, Patient Position: Sitting, BP Cuff Size: Medium Adult)   Pulse 77   Temp 98.3 °F (36.8 °C) (Oral)   Resp 16   Ht 1.575 m (5' 2\")   Wt 104.1 kg (229 lb 6.4 oz)   SpO2 98%   BMI 41.96 kg/m²     Have you been to the ER, urgent care clinic since your last visit?  Hospitalized since your last visit?   NO    Have you seen or consulted any other health care providers outside our system since your last visit?   YES, [facility], [facility]    Have you had a mammogram?”   NO    Date of last Mammogram: 4/27/2023       “Have you had a colorectal cancer screening such as a colonoscopy/FIT/Cologuard?    NO    No colonoscopy on file  No cologuard on file  No FIT/FOBT on file   No flexible sigmoidoscopy on file     “Have you had a diabetic eye exam?”    YES, [facility]    No diabetic eye exam on file

## 2025-05-22 ENCOUNTER — TELEPHONE (OUTPATIENT)
Facility: CLINIC | Age: 55
End: 2025-05-22

## 2025-05-22 NOTE — TELEPHONE ENCOUNTER
Returned call to pt, called pt to inquire about the short term disability form that was left at the clinic, asked if it was regarding her wrist, she stated yes, informed her that PCP referred her to orthopedics for them to treat and he is not currently treating her wrist, she stated it is also for her hypertension because her B/P's have been elevated, form has been completed and placed in MD's box for signing

## 2025-06-13 NOTE — PERIOP NOTE
Dr. Lynch aware of patient HR. States it is okay to take patient to Same Day. No further orders at this time.    [FreeTextEntry1] :  Routine GYN Exam: - Discussed and reviewed importance of monthly BSE - Importance safe sexual practices discussed - Refused STI testing today. - Vaginal Pap/HPV test collected and sent at today's visit. - RX mammo given to pt with locations and instructions. - Patient is unsure of last Colonoscopy.  - Osteoporosis prevention; recommending Vitamin D/Calcium supplementation and weight bearing exercise to maintain bone density; s/p DEXA 1/2024 and due next 1/2026.  - Follow up with PCP for recommended HCM, vaccinations and CA screening  RTO in 1 year or sooner if needed.

## 2025-07-30 ENCOUNTER — OFFICE VISIT (OUTPATIENT)
Facility: CLINIC | Age: 55
End: 2025-07-30
Payer: COMMERCIAL

## 2025-07-30 VITALS
TEMPERATURE: 98 F | DIASTOLIC BLOOD PRESSURE: 88 MMHG | RESPIRATION RATE: 16 BRPM | HEART RATE: 81 BPM | SYSTOLIC BLOOD PRESSURE: 138 MMHG | WEIGHT: 238.8 LBS | HEIGHT: 62 IN | BODY MASS INDEX: 43.94 KG/M2 | OXYGEN SATURATION: 98 %

## 2025-07-30 DIAGNOSIS — E66.01 MORBID OBESITY WITH BMI OF 40.0-44.9, ADULT (HCC): ICD-10-CM

## 2025-07-30 DIAGNOSIS — E78.2 MIXED HYPERLIPIDEMIA: ICD-10-CM

## 2025-07-30 DIAGNOSIS — I11.9 MALIGNANT HYPERTENSIVE HEART DISEASE WITHOUT HEART FAILURE: ICD-10-CM

## 2025-07-30 DIAGNOSIS — Z12.11 COLON CANCER SCREENING: ICD-10-CM

## 2025-07-30 DIAGNOSIS — J30.89 NON-SEASONAL ALLERGIC RHINITIS, UNSPECIFIED TRIGGER: ICD-10-CM

## 2025-07-30 DIAGNOSIS — E55.9 VITAMIN D DEFICIENCY: ICD-10-CM

## 2025-07-30 DIAGNOSIS — E11.9 CONTROLLED TYPE 2 DIABETES MELLITUS WITHOUT COMPLICATION, WITH LONG-TERM CURRENT USE OF INSULIN (HCC): Primary | ICD-10-CM

## 2025-07-30 DIAGNOSIS — G56.02 CARPAL TUNNEL SYNDROME OF LEFT WRIST: ICD-10-CM

## 2025-07-30 DIAGNOSIS — Z12.31 ENCOUNTER FOR SCREENING MAMMOGRAM FOR MALIGNANT NEOPLASM OF BREAST: ICD-10-CM

## 2025-07-30 DIAGNOSIS — K21.9 GASTROESOPHAGEAL REFLUX DISEASE WITHOUT ESOPHAGITIS: ICD-10-CM

## 2025-07-30 DIAGNOSIS — Z79.4 CONTROLLED TYPE 2 DIABETES MELLITUS WITHOUT COMPLICATION, WITH LONG-TERM CURRENT USE OF INSULIN (HCC): Primary | ICD-10-CM

## 2025-07-30 LAB
GLUCOSE, POC: 138 MG/DL
HBA1C MFR BLD: 6.7 %

## 2025-07-30 PROCEDURE — 99214 OFFICE O/P EST MOD 30 MIN: CPT | Performed by: FAMILY MEDICINE

## 2025-07-30 PROCEDURE — 3075F SYST BP GE 130 - 139MM HG: CPT | Performed by: FAMILY MEDICINE

## 2025-07-30 PROCEDURE — 3044F HG A1C LEVEL LT 7.0%: CPT | Performed by: FAMILY MEDICINE

## 2025-07-30 PROCEDURE — 82962 GLUCOSE BLOOD TEST: CPT | Performed by: FAMILY MEDICINE

## 2025-07-30 PROCEDURE — 83036 HEMOGLOBIN GLYCOSYLATED A1C: CPT | Performed by: FAMILY MEDICINE

## 2025-07-30 PROCEDURE — 3079F DIAST BP 80-89 MM HG: CPT | Performed by: FAMILY MEDICINE

## 2025-07-30 RX ORDER — OMEPRAZOLE 40 MG/1
40 CAPSULE, DELAYED RELEASE ORAL DAILY
Qty: 90 CAPSULE | Refills: 0 | Status: SHIPPED | OUTPATIENT
Start: 2025-07-30

## 2025-07-30 RX ORDER — ATORVASTATIN CALCIUM 20 MG/1
20 TABLET, FILM COATED ORAL DAILY
Qty: 90 TABLET | Refills: 0 | Status: SHIPPED | OUTPATIENT
Start: 2025-07-30

## 2025-07-30 RX ORDER — ESCITALOPRAM OXALATE 20 MG/1
20 TABLET ORAL DAILY
Qty: 90 TABLET | Refills: 0 | Status: SHIPPED | OUTPATIENT
Start: 2025-07-30

## 2025-07-30 RX ORDER — METFORMIN HYDROCHLORIDE 500 MG/1
500 TABLET, EXTENDED RELEASE ORAL 3 TIMES DAILY
Qty: 270 TABLET | Refills: 0 | Status: SHIPPED | OUTPATIENT
Start: 2025-07-30 | End: 2025-10-28

## 2025-07-30 RX ORDER — LOSARTAN POTASSIUM AND HYDROCHLOROTHIAZIDE 12.5; 1 MG/1; MG/1
1 TABLET ORAL DAILY
Qty: 90 TABLET | Refills: 0 | Status: SHIPPED | OUTPATIENT
Start: 2025-07-30

## 2025-07-30 RX ORDER — FERROUS SULFATE 325(65) MG
325 TABLET ORAL
Qty: 90 TABLET | Refills: 0 | Status: SHIPPED | OUTPATIENT
Start: 2025-07-30

## 2025-07-30 RX ORDER — ACETAMINOPHEN 500 MG
500 TABLET ORAL 4 TIMES DAILY PRN
Qty: 120 TABLET | Refills: 1 | Status: SHIPPED | OUTPATIENT
Start: 2025-07-30

## 2025-07-30 RX ORDER — NITROGLYCERIN 0.4 MG/1
0.4 TABLET SUBLINGUAL EVERY 5 MIN PRN
Qty: 25 TABLET | Refills: 1 | Status: SHIPPED | OUTPATIENT
Start: 2025-07-30

## 2025-07-30 RX ORDER — ALBUTEROL SULFATE 90 UG/1
INHALANT RESPIRATORY (INHALATION)
Qty: 18 G | Refills: 1 | Status: SHIPPED | OUTPATIENT
Start: 2025-07-30

## 2025-07-30 RX ORDER — AMLODIPINE BESYLATE 10 MG/1
10 TABLET ORAL DAILY
Qty: 90 TABLET | Refills: 0 | Status: SHIPPED | OUTPATIENT
Start: 2025-07-30

## 2025-07-30 RX ORDER — AZELASTINE HYDROCHLORIDE 137 UG/1
SPRAY, METERED NASAL
Qty: 1 ML | Refills: 0 | Status: CANCELLED | OUTPATIENT
Start: 2025-07-30

## 2025-07-30 NOTE — PROGRESS NOTES
Chief Complaint   Patient presents with    3 Month Follow-Up    Diabetes     Pt here for her 3 month follow up for diabetes and med refills, still having pain to left arm, requesting refill on Claritin        /88 (BP Site: Right Upper Arm, Patient Position: Sitting, BP Cuff Size: Large Adult)   Pulse 81   Temp 98 °F (36.7 °C) (Oral)   Resp 16   Ht 1.575 m (5' 2\")   Wt 108.3 kg (238 lb 12.8 oz)   SpO2 98%   BMI 43.68 kg/m²     Have you been to the ER, urgent care clinic since your last visit?  Hospitalized since your last visit?   NO    Have you seen or consulted any other health care providers outside our system since your last visit?   NO    Have you had a mammogram?”   NO    Date of last Mammogram: 4/27/2023       “Have you had a colorectal cancer screening such as a colonoscopy/FIT/Cologuard?    NO    No colonoscopy on file  No cologuard on file  No FIT/FOBT on file   No flexible sigmoidoscopy on file     “Have you had a diabetic eye exam?”    NO     No diabetic eye exam on file

## 2025-07-30 NOTE — PROGRESS NOTES
Mark Tompkins is a 54 y.o. female and presents with 3 Month Follow-Up and Diabetes (Pt here for her 3 month follow up for diabetes and med refills, still having pain to left arm, requesting refill on Claritin )  .  Diabetes     with a hx of HTN and Diabetes  54 y.o. Patient here on a routine follow up with no recent symptoms    Subjective:  Cardiovascular Review:  The patient has hypertension   Diet and Lifestyle: generally follows a low fat low cholesterol diet, generally follows a low sodium diet, exercises sporadically  Home BP Monitoring: is not measured at home.  Pertinent ROS: taking medications as instructed, no medication side effects noted, no TIA's, no chest pain on exertion, no dyspnea on exertion, no swelling of ankles.     Review of Systems  Review of Systems - Musculoskeletal ROS: positive for - pain in left arm and left hand       Past Medical History:   Diagnosis Date    Anemia     Back pain     Congestive heart failure (CHF) (HCC)     Depression     GERD (gastroesophageal reflux disease)     High cholesterol     Hx of blood clots     Hypertension     Plantar fasciitis     SOB (shortness of breath)     Type 2 diabetes mellitus (HCC)     Vitamin D deficiency      Past Surgical History:   Procedure Laterality Date    CARDIAC PROCEDURE N/A 10/25/2024    Left heart cath / coronary angiography performed by Juan M Lynch MD at [facility] CARDIAC CATH LAB    HYSTERECTOMY (CERVIX STATUS UNKNOWN)      TUBAL LIGATION       Social History     Socioeconomic History    Marital status: Single     Spouse name: None    Number of children: None    Years of education: None    Highest education level: None   Tobacco Use    Smoking status: Never    Smokeless tobacco: Never   Vaping Use    Vaping status: Never Used   Substance and Sexual Activity    Alcohol use: Not Currently    Drug use: Not Currently    Sexual activity: Yes     Birth control/protection: Surgical     Social Drivers of Health     Financial Resource

## (undated) LAB — GLUCOSE POC: 116 MG/DL

## (undated) DEVICE — CATHETER DIAG 5FR L110CM PIG CRV SZ 6 SIDE H DBL BRAID WIRE

## (undated) DEVICE — DRAPE,APERTURE,MINOR PROCEDURE: Brand: MEDLINE

## (undated) DEVICE — GUIDEWIRE VASC L260CM DIA0.035IN TIP L3MM STD EXCHG PTFE J

## (undated) DEVICE — SYRINGE MED 10ML RED POLYCARB BRL FIX M LUER CONN FLAT GRP

## (undated) DEVICE — RADIFOCUS OPTITORQUE ANGIOGRAPHIC CATHETER: Brand: OPTITORQUE

## (undated) DEVICE — SYRINGE MED 10ML PUR GAM COMPATIBLE POLYCARB FIX M LUER CONN

## (undated) DEVICE — WASTEBAG DRIP/ADAPTER: Brand: MEDLINE INDUSTRIES, INC.

## (undated) DEVICE — CATHETER DIAG 5FR L100CM LUMN ID0.047IN JL3.5 CRV 0 SIDE H

## (undated) DEVICE — TRAY SURG CUST CRD CATH 3 PRT SSR

## (undated) DEVICE — BAND COMPR L24CM REG CLR PLAS HEMSTAT EXT HK AND LOOP RETEN

## (undated) DEVICE — GLIDESHEATH SLENDER ACCESS KIT: Brand: GLIDESHEATH SLENDER

## (undated) DEVICE — SYSTEM RETENTION PANNUS 2 PADS 1 STRAP

## (undated) DEVICE — TUBING ANGIO L30IN ID18MM 1200PSI POLYUR FLX BRAID AIRLESS